# Patient Record
Sex: FEMALE | Race: BLACK OR AFRICAN AMERICAN | Employment: STUDENT | URBAN - METROPOLITAN AREA
[De-identification: names, ages, dates, MRNs, and addresses within clinical notes are randomized per-mention and may not be internally consistent; named-entity substitution may affect disease eponyms.]

---

## 2017-01-06 ENCOUNTER — OFFICE VISIT (OUTPATIENT)
Dept: OBGYN CLINIC | Age: 22
End: 2017-01-06

## 2017-01-06 VITALS
WEIGHT: 173 LBS | HEIGHT: 64 IN | SYSTOLIC BLOOD PRESSURE: 105 MMHG | RESPIRATION RATE: 18 BRPM | BODY MASS INDEX: 29.53 KG/M2 | DIASTOLIC BLOOD PRESSURE: 70 MMHG

## 2017-01-06 DIAGNOSIS — Z32.01 POSITIVE PREGNANCY TEST: ICD-10-CM

## 2017-01-06 DIAGNOSIS — N92.6 MISSED MENSES: ICD-10-CM

## 2017-01-06 DIAGNOSIS — Z34.81 ENCOUNTER FOR SUPERVISION OF OTHER NORMAL PREGNANCY IN FIRST TRIMESTER: Primary | ICD-10-CM

## 2017-01-06 LAB
ANTIBODY SCREEN, EXTERNAL: NEGATIVE
CHLAMYDIA, EXTERNAL: NEGATIVE
HBSAG, EXTERNAL: NEGATIVE
HCT, EXTERNAL: 34.6
HGB, EXTERNAL: 10.8
HIV, EXTERNAL: NEGATIVE
N. GONORRHEA, EXTERNAL: NEGATIVE
PLATELET CNT,   EXTERNAL: 335
RUBELLA, EXTERNAL: 4.2
T. PALLIDUM, EXTERNAL: NEGATIVE
TYPE, ABO & RH, EXTERNAL: NORMAL

## 2017-01-06 NOTE — PROGRESS NOTES
Current pregnancy history:    Rocío Barrett is a ,  24 y.o. female [address] No LMP recorded. .  She presents for the evaluation of amenorrhea and a positive pregnancy test.    LMP history:  The date of her LMP is  is certain. Her last menstrual period was normal and lasted for 4 to 5 days. A urine pregnancy test was positive 3 weeks ago. She was not on the pill at conception. Based on her LMP, her EDC is 17 and her EGA is 7 weeks, 2 days. Her menstrual cycles are regular and occur approximately every 35 days and range from 3 to 5 days. The last menses lasted the usual number of days. Ultrasound data:  She had an  ultrasound done by the physician today which revealed a viable bedoya pregnancy with a gestational age of 7 weeks and 3 days giving an [address] of 17. Pregnancy symptoms:    Since her LMP she has experienced  urinary frequency, breast tenderness, and nausea. She has not been vomiting over the last few weeks. Associated signs and symptoms which she denies: dysuria, discharge, vaginal bleeding. She states she has gained weight:  Approximately 5 pounds over the last few weeks. Relevant past pregnancy history:   She has the following pregnancy history: Her first pregnancy was uncomplicated. She has no history of  delivery. Relevant past medical history:(relevant to this pregnancy): noncontributory. Pap/Occupational history:  Last pap smear: last year Results: Normal      Her occupation is: . Lives in [place]    Substance history: negative for alcohol, tobacco and street drugs. Positive for nothing. Exposure history: There is/are no indoor cat/s in the home. The patient was instructed to not change the cat litter. She admits close contact with children on a regular basis. She has had chicken pox or the vaccine in the past.   Patient denies issues with domestic violence.      Genetic Screening/Teratology Counseling: (Includes patient, baby's father, or anyone in either family with:)  3.  Patient's age >/= 28 [facility]?-- no  .   2. [facility] ([facility] [place] [address], or  background): MCV<80?--no.     3.  Neural tube defect (meningomyelocele, spina bifida, anencephaly)?--no.   4.  Congenital heart defect?--no.  5.  Down syndrome?--no.   6.  Kaleb-Sachs ([employer], [place])?--no.   7.  Canavan's Disease?--no.   8.  Familial Dysautonomia?--no.   9.  Sickle cell disease or trait ()? --no   The patient has not been tested for sickle trait  10. Hemophilia or other blood disorders?--no. 11.  Muscular dystrophy?--no. 12.  Cystic fibrosis?--no. 13.  Abeba's Chorea?--no. 14.  Mental retardation/autism (if yes was person tested for Fragile X)?--no. 15.  Other inherited genetic or chromosomal disorder?--no. 12.  Maternal metabolic disorder (DM, PKU, etc)?--no. 17.  Patient or FOB with a child with a birth defect not listed above?--no.  17a. Patient or FOB with a birth defect themselves?--no. 18.  Recurrent pregnancy loss, or stillbirth?--no. 19.  Any medications since LMP other than prenatal vitamins (include vitamins, supplements, OTC meds, drugs, alcohol)?--no. 20.  Any other genetic/environmental exposure to discuss?--no. Infection History:  1. Lives with someone with TB or TB exposed?--no.   2.  Patient or partner has history of genital herpes?--no.  3.  Rash or viral illness since LMP?--no.    4.  History of STD (GC, CT, HPV, syphilis, HIV)? --no   5. Other: OTHER? Past Medical History   Diagnosis Date    Asthma      Past Surgical History   Procedure Laterality Date    Hx dilation and curettage  08/10/2016     Missed AB     Social History     Occupational History    Not on file.      Social History Main Topics    Smoking status: Never Smoker    Smokeless tobacco: Never Used    Alcohol use No    Drug use: No    Sexual activity: Yes     Partners: Male     Birth control/ protection: None     Family History   Problem Relation Age of Onset    Hypertension Brother     Stroke Paternal Grandmother     Diabetes Maternal Grandmother     No Known Problems Mother     No Known Problems Father      OB History    Para Term  AB SAB TAB Ectopic Multiple Living   3 1 1 0 2 2 0 0 0 1      # Outcome Date GA Lbr Mirza/2nd Weight Sex Delivery Anes PTL Lv   3 Term 07/13/15 40w2d   M Vag-Spont  N Y   2 SAB            1 SAB                 No Known Allergies  Prior to Admission medications    Medication Sig Start Date End Date Taking? Authorizing Provider   PRENATAL VIT W-CA,FE,FA,<1 MG, (PRENATAL VITAMIN PO) Take  by mouth.    Yes Historical Provider        Review of Systems: History obtained from the patient  Constitutional: negative for weight loss, fever, night sweats  HEENT: negative for hearing loss, earache, congestion, snoring, sore throat  CV: negative for chest pain, palpitations, edema  Resp: negative for cough, shortness of breath, wheezing  Breast: negative for breast lumps, nipple discharge, galactorrhea  GI: negative for change in bowel habits, abdominal pain, black or bloody stools  : negative for frequency, dysuria, hematuria, vaginal discharge  MSK: negative for back pain, joint pain, muscle pain  Skin: negative for itching, rash, hives  Neuro: negative for dizziness, headache, confusion, weakness  Psych: negative for anxiety, depression, change in mood  Heme/lymph: negative for bleeding, bruising, pallor    Objective:  Visit Vitals    /70 (BP 1 Location: Right arm, BP Patient Position: Sitting)    Resp 18    Ht 5' 4\" (1.626 m)    Wt 173 lb (78.5 kg)    BMI 29.7 kg/m2       Physical Exam:     Constitutional  · Appearance: well-nourished, well developed, alert, in no acute distress    HENT  · Head  · Face: appears normal  · Eyes: appear normal  · Ears: normal  · Mouth: normal  · Lips: no lesions    Neck  · Inspection/Palpation: normal appearance, no masses or tenderness  · Lymph Nodes: no lymphadenopathy present  · Thyroid: gland size normal, nontender, no nodules or masses present on palpation    Chest  · Respiratory Effort: breathing unlabored  · Auscultation: normal breath sounds    Cardiovascular  · Heart:  · Auscultation: regular rate and rhythm without murmur    Breasts  · Inspection of Breasts: breasts symmetrical, no skin changes, no discharge present, nipple appearance normal, no skin retraction present  · Palpation of Breasts and Axillae: no masses present on palpation, no breast tenderness  · Axillary Lymph Nodes: no lymphadenopathy present    Gastrointestinal  · Abdominal Examination: abdomen non-tender to palpation, normal bowel sounds, no masses present  · Liver and spleen: no hepatomegaly present, spleen not palpable  · Hernias: no hernias identified    Genitourinary  · External Genitalia: normal appearance for age, no discharge present, no tenderness present, no inflammatory lesions present, no masses present, no atrophy present  · Vagina: normal vaginal vault without central or paravaginal defects, no discharge present, no inflammatory lesions present, no masses present  · Bladder: non-tender to palpation  · Urethra: appears normal  · Cervix: normal   · Uterus: enlarged, normal shape, soft  · Adnexa: no adnexal tenderness present, no adnexal masses present  · Perineum: perineum within normal limits, no evidence of trauma, no rashes or skin lesions present  · Anus: anus within normal limits, no hemorrhoids present  · Inguinal Lymph Nodes: no lymphadenopathy present    Skin  · General Inspection: no rash, no lesions identified    Neurologic/Psychiatric  · Mental Status:  · Orientation: grossly oriented to person, place and time  · Mood and Affect: mood normal, affect appropriate    Assessment:   Intrauterine pregnancy with the following problems identified: dates off.     Plan:     Offered CF testing, CVS, Nuchal Translucency, MSAFP, amnio, and discussed NIPT  Course of pregnancy discussed including visit schedule, routine U/S, glucola testing, etc.  Avoid alcoholic beverages and illicit/recreational drugs use  Take prenatal vitamins or folic acid daily. Hospital and practice style discussed with coverage system. Discussed nutrition, toxoplasmosis precautions, sexual activity, exercise, need for influenza vaccine, environmental and work hazards, travel advice, screen for domestic violence, need for seat belts. Discussed seafood, unpasteurized dairy products, deli meat, artificial sweeteners, and caffeine. Information on prenatal classes/breastfeeding given. Information on circumcision given  Patient encouraged not to smoke. Discussed current prescription drug use. Given medication list.  Discussed the use of over the counter medications and chemicals. Route of delivery discussed, including risks, benefits, and alternatives of  versus repeat LTCS. Pt understands risk of hemorrhage during pregnancy and post delivery and would accept blood products if necessary in life-threatening emergencies    Handouts given to pt. Transvaginal First Trimester Ultrasound Procedure    Felix Britt is a ,  24 y.o. female [address] No LMP recorded. This makes her currently 7 weeks and 2 days of gestation by dates. She is here today for early pregnancy ultrasound for pregnancy dating. Ultrasound results:   A bedoya intrauterine pregnancy with visible cardiac activity is seen. The yolk sac is visible and measures approximately 0.46 cm in diameter.   The crown rump length of the fetus is measurable at 0.59 cm, consistent with 6 weeks and 3 days  Subchorionic hemorrhage was not seen  Right Ovary - NORMAL   Left Ovary - NORMAL   Comment:

## 2017-01-09 LAB — BACTERIA UR CULT: NORMAL

## 2017-01-10 LAB
ABO GROUP BLD: NORMAL
BLD GP AB SCN SERPL QL: NEGATIVE
C TRACH RRNA SPEC QL NAA+PROBE: NEGATIVE
ERYTHROCYTE [DISTWIDTH] IN BLOOD BY AUTOMATED COUNT: 16.3 % (ref 12.3–15.4)
HBV SURFACE AG SERPL QL IA: NEGATIVE
HCT VFR BLD AUTO: 34.6 % (ref 34–46.6)
HGB A MFR BLD: 98 % (ref 94–98)
HGB A2 MFR BLD COLUMN CHROM: 2 % (ref 0.7–3.1)
HGB BLD-MCNC: 10.8 G/DL (ref 11.1–15.9)
HGB C MFR BLD: 0 %
HGB F MFR BLD: 0 % (ref 0–2)
HGB FRACT BLD-IMP: NORMAL
HGB S BLD QL SOLY: NEGATIVE
HGB S MFR BLD: 0 %
HIV 1+2 AB+HIV1 P24 AG SERPL QL IA: NON REACTIVE
MCH RBC QN AUTO: 23.2 PG (ref 26.6–33)
MCHC RBC AUTO-ENTMCNC: 31.2 G/DL (ref 31.5–35.7)
MCV RBC AUTO: 74 FL (ref 79–97)
N GONORRHOEA RRNA SPEC QL NAA+PROBE: NEGATIVE
PLATELET # BLD AUTO: 335 X10E3/UL (ref 150–379)
RBC # BLD AUTO: 4.65 X10E6/UL (ref 3.77–5.28)
RH BLD: POSITIVE
RUBV IGG SERPL IA-ACNC: 4.2 INDEX
T PALLIDUM AB SER QL IA: NEGATIVE
WBC # BLD AUTO: 8.8 X10E3/UL (ref 3.4–10.8)

## 2017-01-27 ENCOUNTER — ROUTINE PRENATAL (OUTPATIENT)
Dept: OBGYN CLINIC | Age: 22
End: 2017-01-27

## 2017-01-27 VITALS
DIASTOLIC BLOOD PRESSURE: 58 MMHG | HEIGHT: 64 IN | SYSTOLIC BLOOD PRESSURE: 110 MMHG | WEIGHT: 171.2 LBS | BODY MASS INDEX: 29.23 KG/M2

## 2017-01-27 DIAGNOSIS — Z34.81 OTHER NORMAL PREGNANCY, NOT FIRST, FIRST TRIMESTER: Primary | ICD-10-CM

## 2017-01-27 NOTE — PROGRESS NOTES
TA ULTRASOUND PERFORMED. A SINGLE VIABLE 9W1D IUP IS SEEN WITH NORMAL CARDIAC RHYTHM. GESTATIONAL AGE BASED ON TODAYS US.  A NORMAL APPEARING YOLK [address] IS SEEN. RIGHT & LEFT ADNEXA APPEAR WITHIN NORMAL LIMITS.

## 2017-01-27 NOTE — PATIENT INSTRUCTIONS

## 2017-02-24 ENCOUNTER — ROUTINE PRENATAL (OUTPATIENT)
Dept: OBGYN CLINIC | Age: 22
End: 2017-02-24

## 2017-02-24 VITALS
HEIGHT: 64 IN | DIASTOLIC BLOOD PRESSURE: 68 MMHG | WEIGHT: 168 LBS | BODY MASS INDEX: 28.68 KG/M2 | RESPIRATION RATE: 18 BRPM | SYSTOLIC BLOOD PRESSURE: 108 MMHG

## 2017-02-24 DIAGNOSIS — Z34.82 OTHER NORMAL PREGNANCY, NOT FIRST, SECOND TRIMESTER: Primary | ICD-10-CM

## 2017-02-24 NOTE — PATIENT INSTRUCTIONS

## 2017-03-24 ENCOUNTER — ROUTINE PRENATAL (OUTPATIENT)
Dept: OBGYN CLINIC | Age: 22
End: 2017-03-24

## 2017-03-24 VITALS
HEIGHT: 64 IN | BODY MASS INDEX: 29.88 KG/M2 | DIASTOLIC BLOOD PRESSURE: 60 MMHG | SYSTOLIC BLOOD PRESSURE: 106 MMHG | WEIGHT: 175 LBS

## 2017-03-24 DIAGNOSIS — Z34.82 OTHER NORMAL PREGNANCY, NOT FIRST, SECOND TRIMESTER: Primary | ICD-10-CM

## 2017-03-24 LAB — AFP, MATERNAL, EXTERNAL: NEGATIVE

## 2017-03-24 NOTE — PATIENT INSTRUCTIONS

## 2017-03-26 LAB
AFP ADJ MOM SERPL: 0.62
AFP INTERP SERPL-IMP: NORMAL
AFP INTERP SERPL-IMP: NORMAL
AFP SERPL-MCNC: 23.1 NG/ML
AGE AT DELIVERY: 23.1 YEARS
COMMENT, 018013: NORMAL
GA METHOD: NORMAL
GA: 17 WEEKS
IDDM PATIENT QL: NO
Lab: NORMAL
MULTIPLE PREGNANCY: NO
NEURAL TUBE DEFECT RISK FETUS: NORMAL %
RESULTS, 017004: NORMAL

## 2017-04-21 ENCOUNTER — ROUTINE PRENATAL (OUTPATIENT)
Dept: OBGYN CLINIC | Age: 22
End: 2017-04-21

## 2017-04-21 VITALS
SYSTOLIC BLOOD PRESSURE: 110 MMHG | WEIGHT: 176 LBS | HEIGHT: 64 IN | BODY MASS INDEX: 30.05 KG/M2 | DIASTOLIC BLOOD PRESSURE: 64 MMHG

## 2017-04-21 DIAGNOSIS — Z34.82 OTHER NORMAL PREGNANCY, NOT FIRST, SECOND TRIMESTER: Primary | ICD-10-CM

## 2017-04-21 NOTE — PROGRESS NOTES
SINGLE VIABLE IUP AT 21W1D GA BY LMP. FETAL CARDIAC MOTION OBSERVED. FETAL ANATOMY WELL VISUALIZED AND APPEARS WITHIN NORMAL LIMITS. NO ABNORMALITIES IDENTIFIED ON TODAYS EXAM.  APPROPRIATE GROWTH MEASURED; SIZE = DATES. AMADOU, CERVIX AND PLACENTA APPEAR WITHIN NORMAL LIMITS.   GENDER: XY

## 2017-05-12 ENCOUNTER — ROUTINE PRENATAL (OUTPATIENT)
Dept: OBGYN CLINIC | Age: 22
End: 2017-05-12

## 2017-05-12 VITALS
SYSTOLIC BLOOD PRESSURE: 118 MMHG | DIASTOLIC BLOOD PRESSURE: 62 MMHG | WEIGHT: 179 LBS | BODY MASS INDEX: 30.56 KG/M2 | HEIGHT: 64 IN

## 2017-05-12 DIAGNOSIS — Z34.82 OTHER NORMAL PREGNANCY, NOT FIRST, SECOND TRIMESTER: Primary | ICD-10-CM

## 2017-05-12 NOTE — PATIENT INSTRUCTIONS
Weeks 22 to 26 of Your Pregnancy: Care Instructions  Your Care Instructions    As you enter your 7th month of pregnancy at week 26, your baby's lungs are growing stronger and getting ready to breathe. You may notice that your baby responds to the sound of your or your partner's voice. You may also notice that your baby does less turning and twisting and more squirming or jerking. Jerking often means that your baby has the hiccups. Hiccups are perfectly normal and are only temporary. You may want to think about attending a childbirth preparation class. This is also a good time to start thinking about whether you want to have pain medicine during labor. Most pregnant women are tested for gestational diabetes between weeks 25 and 28. Gestational diabetes occurs when your blood sugar level gets too high when you're pregnant. The test is important, because you can have gestational diabetes and not know it. But the condition can cause problems for your baby. Follow-up care is a key part of your treatment and safety. Be sure to make and go to all appointments, and call your doctor if you are having problems. It's also a good idea to know your test results and keep a list of the medicines you take. How can you care for yourself at home? Ease discomfort from your baby's kicking  · Change your position. Sometimes this will cause your baby to change position too. · Take a deep breath while you raise your arm over your head. Then breathe out while you drop your arm. Do Kegel exercises to prevent urine from leaking  · You can do Kegel exercises while you stand or sit. ¨ Squeeze the same muscles you would use to stop your urine. Your belly and thighs should not move. ¨ Hold the squeeze for 3 seconds, and then relax for 3 seconds. ¨ Start with 3 seconds. Then add 1 second each week until you are able to squeeze for 10 seconds. ¨ Repeat the exercise 10 to 15 times for each session.  Do three or more sessions each day.  Ease or reduce swelling in your feet, ankles, hands, and fingers  · If your fingers are puffy, take off your rings. · Do not eat high-salt foods, such as potato chips. · Prop up your feet on a stool or couch as much as possible. Sleep with pillows under your feet. · Do not stand for long periods of time or wear tight shoes. · Wear support stockings. Where can you learn more? Go to http://willian-gina.info/. Enter G264 in the search box to learn more about \"Weeks 22 to 26 of Your Pregnancy: Care Instructions. \"  Current as of: May 30, 2016  Content Version: 11.2  © 0927-1434 [employer], [employer]. Care instructions adapted under license by [employer] (which disclaims liability or warranty for this information). If you have questions about a medical condition or this instruction, always ask your healthcare professional. [place] [address] any warranty or liability for your use of this information.

## 2017-06-16 ENCOUNTER — ROUTINE PRENATAL (OUTPATIENT)
Dept: OBGYN CLINIC | Age: 22
End: 2017-06-16

## 2017-06-16 VITALS
WEIGHT: 188 LBS | BODY MASS INDEX: 32.1 KG/M2 | HEIGHT: 64 IN | DIASTOLIC BLOOD PRESSURE: 68 MMHG | SYSTOLIC BLOOD PRESSURE: 114 MMHG

## 2017-06-16 DIAGNOSIS — Z34.83 OTHER NORMAL PREGNANCY, NOT FIRST, THIRD TRIMESTER: Primary | ICD-10-CM

## 2017-06-16 LAB
GTT, 1 HR, GLUCOLA, EXTERNAL: 108
HCT, EXTERNAL: 29.9
HGB, EXTERNAL: 9.6
PLATELET CNT,   EXTERNAL: 274

## 2017-06-16 NOTE — PATIENT INSTRUCTIONS
Weeks 26 to 30 of Your Pregnancy: Care Instructions  Your Care Instructions    You are now in your last trimester of pregnancy. Your baby is growing rapidly. And you'll probably feel your baby moving around more often. Your doctor may ask you to count your baby's kicks. Your back may ache as your body gets used to your baby's size and length. If you haven't already had the Tdap shot during this pregnancy, talk to your doctor about getting it. It will help protect your  against pertussis infection. During this time, it's important to take care of yourself and pay attention to what your body needs. If you feel sexual, explore ways to be close with your partner that match your comfort and desire. Use the tips provided in this care sheet to find ways to be sexual in your own way. Follow-up care is a key part of your treatment and safety. Be sure to make and go to all appointments, and call your doctor if you are having problems. It's also a good idea to know your test results and keep a list of the medicines you take. How can you care for yourself at home? Take it easy at work  · Take frequent breaks. If possible, stop working when you are tired, and rest during your lunch hour. · Take bathroom breaks every 2 hours. · Change positions often. If you sit for long periods, stand up and walk around. · When you stand for a long time, keep one foot on a low stool with your knee bent. After standing a lot, sit with your feet up. · Avoid fumes, chemicals, and tobacco smoke. Be sexual in your own way  · Having sex during pregnancy is okay, unless your doctor tells you not to. · You may be very interested in sex, or you may have no interest at all. · Your growing belly can make it hard to find a good position during intercourse. [place] and explore. · You may get cramps in your uterus when your partner touches your breasts.   · A back rub may relieve the backache or cramps that sometimes follow orgasm. Learn about  labor  · Watch for signs of  labor. You may be going into labor if:  ¨ You have menstrual-like cramps, with or without nausea. ¨ You have about 4 or more contractions in 20 minutes, or about 8 or more within 1 hour, even after you have had a glass of water and are resting. ¨ You have a low, dull backache that does not go away when you change your position. ¨ You have pain or pressure in your pelvis that comes and goes in a pattern. ¨ You have intestinal cramping or flu-like symptoms, with or without diarrhea. ¨ You notice an increase or change in your vaginal discharge. Discharge may be heavy, mucus-like, watery, or streaked with blood. ¨ Your water breaks. · If you think you have  labor:  ¨ Drink 2 or 3 glasses of water or juice. Not drinking enough fluids can cause contractions. ¨ Stop what you are doing, and empty your bladder. Then lie down on your left side for at least 1 hour. ¨ While lying on your side, find your breast bone. Put your fingers in the soft spot just below it. Move your fingers down toward your belly button to find the top of your uterus. Check to see if it is tight. ¨ Contractions can be weak or strong. Record your contractions for an hour. Time a contraction from the start of one contraction to the start of the next one. ¨ Single or several strong contractions without a pattern are called Jeremi-Newman contractions. They are practice contractions but not the start of labor. They often stop if you change what you are doing. ¨ Call your doctor if you have regular contractions. Where can you learn more? Go to http://willian-gina.info/. Enter Q575 in the search box to learn more about \"Weeks 26 to 30 of Your Pregnancy: Care Instructions. \"  Current as of: May 30, 2016  Content Version: 11.2  © 8832-5856 [employer].  Care instructions adapted under license by [employer] (which disclaims liability or warranty for this information). If you have questions about a medical condition or this instruction, always ask your healthcare professional. [place] [address] any warranty or liability for your use of this information.

## 2017-06-16 NOTE — PROGRESS NOTES
Patient in for routine OB visit. Per MD recommendations, patient to receive TDAP vaccine this visit. Informed consent signed by patient. This writer administered medication via injection in right deltoid. Informed patient of possible site soreness and/or redness. Expresses understanding at this time. Patient tolerated injection well without difficulty.

## 2017-06-17 LAB
ERYTHROCYTE [DISTWIDTH] IN BLOOD BY AUTOMATED COUNT: 16 % (ref 12.3–15.4)
GLUCOSE 1H P 50 G GLC PO SERPL-MCNC: 108 MG/DL (ref 65–129)
HCT VFR BLD AUTO: 29.9 % (ref 34–46.6)
HGB BLD-MCNC: 9.6 G/DL (ref 11.1–15.9)
MCH RBC QN AUTO: 23.2 PG (ref 26.6–33)
MCHC RBC AUTO-ENTMCNC: 32.1 G/DL (ref 31.5–35.7)
MCV RBC AUTO: 72 FL (ref 79–97)
PLATELET # BLD AUTO: 274 X10E3/UL (ref 150–379)
RBC # BLD AUTO: 4.14 X10E6/UL (ref 3.77–5.28)
WBC # BLD AUTO: 8.5 X10E3/UL (ref 3.4–10.8)

## 2017-06-19 RX ORDER — LANOLIN ALCOHOL/MO/W.PET/CERES
325 CREAM (GRAM) TOPICAL 2 TIMES DAILY
Qty: 60 TAB | Refills: 3 | Status: SHIPPED | OUTPATIENT
Start: 2017-06-19 | End: 2017-08-27

## 2017-06-19 NOTE — PROGRESS NOTES
Patient called at 9:490am. Patient advised of MD reviewed labs and recommendations. Patient advised to keep up fluids and she may want to take a stool softener to help with any possible constipation. Patient advised that GBS will be checked at around 36 weeks. Patient verbalized understanding.

## 2017-06-19 NOTE — PROGRESS NOTES
Called patients mobile phone on file, was patients significant other. Asked him to advise her to call us back at her convenience.

## 2017-06-29 ENCOUNTER — ROUTINE PRENATAL (OUTPATIENT)
Dept: OBGYN CLINIC | Age: 22
End: 2017-06-29

## 2017-06-29 VITALS
SYSTOLIC BLOOD PRESSURE: 128 MMHG | HEIGHT: 64 IN | DIASTOLIC BLOOD PRESSURE: 68 MMHG | WEIGHT: 191 LBS | BODY MASS INDEX: 32.61 KG/M2

## 2017-06-29 DIAGNOSIS — Z34.83 OTHER NORMAL PREGNANCY, NOT FIRST, THIRD TRIMESTER: Primary | ICD-10-CM

## 2017-06-29 NOTE — PATIENT INSTRUCTIONS
Weeks 30 to 32 of Your Pregnancy: Care Instructions  Your Care Instructions    You have made it to the final months of your pregnancy. By now, your baby is really starting to look like a baby, with hair and plump skin. As you enter the final weeks of pregnancy, the reality of having a baby may start to set in. This is the time to settle on a name, get your household in order, set up a safe nursery, and find quality  if needed. Doing these things in advance will allow you to focus on caring for and enjoying your new baby. You may also want to have a tour of your hospital's labor and delivery unit to get a better idea of what to expect while you are in the hospital.  During these last months, it is very important to take good care of yourself and pay attention to what your body needs. If your doctor says it is okay for you to work, don't push yourself too hard. Use the tips provided in this care sheet to ease heartburn and care for varicose veins. If you haven't already had the Tdap shot during this pregnancy, talk to your doctor about getting it. It will help protect your  against pertussis infection. Follow-up care is a key part of your treatment and safety. Be sure to make and go to all appointments, and call your doctor if you are having problems. It's also a good idea to know your test results and keep a list of the medicines you take. How can you care for yourself at home? Pay attention to your baby's movements  · You should feel your baby move several times every day. · Your baby now turns less, and kicks and jabs more. · Your baby sleeps 20 to 45 minutes at a time and is more active at certain times of day. · If your doctor wants you to count your baby's kicks:  ¨ Empty your bladder, and lie on your side or relax in a comfortable chair. ¨ Write down your start time. ¨ Pay attention only to your baby's movements. Count any movement except hiccups.   ¨ After you have counted 10 movements, write down your stop time. ¨ Write down how many minutes it took for your baby to move 10 times. ¨ If an hour goes by and you have not recorded 10 movements, have something to eat or drink and then count for another hour. If you do not record 10 movements in either hour, call your doctor. Ease heartburn  · Eat small, frequent meals. · Do not eat chocolate, peppermint, or very spicy foods. Avoid drinks with caffeine, such as coffee, tea, and sodas. · Avoid bending over or lying down after meals. · Talk a short walk after you eat. · If heartburn is a problem at night, do not eat for 2 hours before bedtime. · Take antacids like Mylanta, Maalox, Rolaids, or Tums. Do not take antacids that have sodium bicarbonate. Care for varicose veins  · Varicose veins are blood vessels that stretch out with the extra blood during pregnancy. Your legs may ache or throb. Most varicose veins will go away after the birth. · Avoid standing for long periods of time. Sit with your legs crossed at the ankles, not the knees. · Sit with your feet propped up. · Avoid tight clothing or stockings. Wear support hose. · Exercise regularly. Try walking for at least 30 minutes a day. Where can you learn more? Go to http://willian-gina.info/. Enter Y474 in the search box to learn more about \"Weeks 30 to 32 of Your Pregnancy: Care Instructions. \"  Current as of: March 16, 2017  Content Version: 11.3  © 6582-5599 [employer]. Care instructions adapted under license by [employer] (which disclaims liability or warranty for this information). If you have questions about a medical condition or this instruction, always ask your healthcare professional. [place] [address] any warranty or liability for your use of this information.

## 2017-07-12 ENCOUNTER — ROUTINE PRENATAL (OUTPATIENT)
Dept: OBGYN CLINIC | Age: 22
End: 2017-07-12

## 2017-07-12 VITALS
HEIGHT: 64 IN | WEIGHT: 194 LBS | DIASTOLIC BLOOD PRESSURE: 62 MMHG | SYSTOLIC BLOOD PRESSURE: 100 MMHG | BODY MASS INDEX: 33.12 KG/M2

## 2017-07-12 DIAGNOSIS — Z34.83 OTHER NORMAL PREGNANCY, NOT FIRST, THIRD TRIMESTER: Primary | ICD-10-CM

## 2017-07-12 NOTE — PATIENT INSTRUCTIONS

## 2017-07-26 ENCOUNTER — ROUTINE PRENATAL (OUTPATIENT)
Dept: OBGYN CLINIC | Age: 22
End: 2017-07-26

## 2017-07-26 VITALS
BODY MASS INDEX: 33.46 KG/M2 | DIASTOLIC BLOOD PRESSURE: 66 MMHG | HEIGHT: 64 IN | WEIGHT: 196 LBS | SYSTOLIC BLOOD PRESSURE: 104 MMHG

## 2017-07-26 DIAGNOSIS — Z34.83 OTHER NORMAL PREGNANCY, NOT FIRST, THIRD TRIMESTER: Primary | ICD-10-CM

## 2017-07-26 LAB — GRBS, EXTERNAL: NEGATIVE

## 2017-07-26 NOTE — PATIENT INSTRUCTIONS

## 2017-07-28 LAB — GP B STREP DNA SPEC QL NAA+PROBE: NEGATIVE

## 2017-08-04 ENCOUNTER — ROUTINE PRENATAL (OUTPATIENT)
Dept: OBGYN CLINIC | Age: 22
End: 2017-08-04

## 2017-08-04 VITALS
BODY MASS INDEX: 33.46 KG/M2 | HEIGHT: 64 IN | SYSTOLIC BLOOD PRESSURE: 110 MMHG | DIASTOLIC BLOOD PRESSURE: 60 MMHG | WEIGHT: 196 LBS

## 2017-08-04 DIAGNOSIS — Z34.83 OTHER NORMAL PREGNANCY, NOT FIRST, THIRD TRIMESTER: Primary | ICD-10-CM

## 2017-08-11 ENCOUNTER — ROUTINE PRENATAL (OUTPATIENT)
Dept: OBGYN CLINIC | Age: 22
End: 2017-08-11

## 2017-08-11 ENCOUNTER — OFFICE VISIT (OUTPATIENT)
Dept: OBGYN CLINIC | Age: 22
End: 2017-08-11

## 2017-08-11 VITALS
BODY MASS INDEX: 34.31 KG/M2 | HEIGHT: 64 IN | WEIGHT: 201 LBS | DIASTOLIC BLOOD PRESSURE: 60 MMHG | SYSTOLIC BLOOD PRESSURE: 114 MMHG

## 2017-08-11 VITALS
WEIGHT: 201 LBS | HEIGHT: 64 IN | DIASTOLIC BLOOD PRESSURE: 60 MMHG | BODY MASS INDEX: 34.31 KG/M2 | SYSTOLIC BLOOD PRESSURE: 114 MMHG

## 2017-08-11 DIAGNOSIS — Z34.83 OTHER NORMAL PREGNANCY, NOT FIRST, THIRD TRIMESTER: Primary | ICD-10-CM

## 2017-08-11 NOTE — LETTER
8/11/2017 3:18 PM 
 
Ms. Rocío Weaver 66 [phone] To Whom It May Concern: The patient is currently under our care. As of Monday August 14, 2017 she will no longer be able to work due to pregnancy complications. She will return 6 weeks after the baby is delivered. If you have any questions please do not hesitate to contact us. Sincerely, Blu Bolton MD

## 2017-08-18 ENCOUNTER — ROUTINE PRENATAL (OUTPATIENT)
Dept: OBGYN CLINIC | Age: 22
End: 2017-08-18

## 2017-08-18 VITALS — BODY MASS INDEX: 34.84 KG/M2 | SYSTOLIC BLOOD PRESSURE: 110 MMHG | DIASTOLIC BLOOD PRESSURE: 60 MMHG | WEIGHT: 203 LBS

## 2017-08-18 DIAGNOSIS — Z34.83 OTHER NORMAL PREGNANCY, NOT FIRST, THIRD TRIMESTER: Primary | ICD-10-CM

## 2017-08-18 NOTE — PATIENT INSTRUCTIONS
Week 38 of Your Pregnancy: Care Instructions  Your Care Instructions    Believe it or not, your baby is almost here. You may have ideas about your baby's personality because of how much he or she moves. Or you may have noticed how he or she responds to sounds, warmth, cold, and light. You may even know what kind of music your baby likes. By now, you have a better idea of what to expect during delivery. You may have talked about your birth preferences with your doctor. But even if you want a vaginal birth, it is a good idea to learn about  births.  birth means that your baby is born through a cut (incision) in your lower belly. It is sometimes the best choice for the health of the baby and the mother. This care sheet can help you understand  births. It also gives you information about what to expect after your baby is born. And it helps you understand more about postpartum depression. Follow-up care is a key part of your treatment and safety. Be sure to make and go to all appointments, and call your doctor if you are having problems. It's also a good idea to know your test results and keep a list of the medicines you take. How can you care for yourself at home? Learn about  birth  · Most C-sections are unplanned. They are done because of problems that occur during labor. These problems might include:  ¨ Labor that slows or stops. ¨ High blood pressure or other problems for the mother. ¨ Signs of distress in the baby. These signs may include a very fast or slow heart rate. · Although most mothers and babies do well after , it is major surgery. It has more risks than a vaginal delivery. · In some cases, a planned  may be safer than a vaginal delivery. This may be the case if:  ¨ The mother has a health problem, such as a heart condition. ¨ The baby isn't in a head-down position for delivery. This is called a breech position.   ¨ The uterus has scars from past surgeries. This could increase the chance of a tear in the uterus. ¨ There is a problem with the placenta. ¨ The mother has an infection, such as genital herpes, that could be spread to the baby. ¨ The mother is having twins or more. ¨ The baby weighs 9 to 10 pounds or more. · Because of the risks of , planned C-sections generally should be done only for medical reasons. And a planned  should be done at 39 weeks or later unless there is a medical reason to do it sooner. Know what to expect after delivery, and plan for the first few weeks at home  · You, your baby, and your partner or  will get identification bands. Only people with matching bands can  the baby from the nursery. · You will learn how to feed, diaper, and bathe your baby. And you will learn how to care for the umbilical cord stump. If your baby will be circumcised, you will also learn how to care for that. · Ask people to wait to visit you until you are at home. And ask them to wash their hands before they touch your baby. · Make sure you have another adult in your home for at least 2 or 3 days after the birth. · During the first 2 weeks, limit when friends and family can visit. · Do not allow visitors who have colds or infections. Make sure all visitors are up to date with their vaccinations. Never let anyone smoke around your baby. · Try to nap when the baby naps. Be aware of postpartum depression  · \"Baby blues\" are common for the first 1 to 2 weeks after birth. You may cry or feel sad or irritable for no reason. · For some women, these feelings last longer and are more intense. This is called postpartum depression. · If your symptoms last for more than a few weeks or you feel very depressed, ask your doctor for help. · Postpartum depression can be treated. Support groups and counseling can help. Sometimes medicine can also help. Where can you learn more?   Go to http://willian-gina.info/. Enter B044 in the search box to learn more about \"Week 38 of Your Pregnancy: Care Instructions. \"  Current as of: March 16, 2017  Content Version: 11.3  © 5773-2882 [employer]. Care instructions adapted under license by [employer] (which disclaims liability or warranty for this information). If you have questions about a medical condition or this instruction, always ask your healthcare professional. [address] any warranty or liability for your use of this information.

## 2017-08-25 ENCOUNTER — ANESTHESIA EVENT (OUTPATIENT)
Dept: LABOR AND DELIVERY | Age: 22
End: 2017-08-25
Payer: OTHER GOVERNMENT

## 2017-08-25 ENCOUNTER — ANESTHESIA (OUTPATIENT)
Dept: LABOR AND DELIVERY | Age: 22
End: 2017-08-25
Payer: OTHER GOVERNMENT

## 2017-08-25 ENCOUNTER — HOSPITAL ENCOUNTER (INPATIENT)
Age: 22
LOS: 2 days | Discharge: HOME | End: 2017-08-27
Attending: OBSTETRICS & GYNECOLOGY | Admitting: OBSTETRICS & GYNECOLOGY
Payer: OTHER GOVERNMENT

## 2017-08-25 PROBLEM — Z34.90 PREGNANT: Status: ACTIVE | Noted: 2017-08-25

## 2017-08-25 PROBLEM — Z34.90 PREGNANCY: Status: ACTIVE | Noted: 2017-08-25

## 2017-08-25 LAB
BASOPHILS # BLD: 0 K/UL (ref 0–0.1)
BASOPHILS NFR BLD: 0 % (ref 0–1)
EOSINOPHIL # BLD: 0.1 K/UL (ref 0–0.4)
EOSINOPHIL NFR BLD: 1 % (ref 0–7)
ERYTHROCYTE [DISTWIDTH] IN BLOOD BY AUTOMATED COUNT: 15.4 % (ref 11.5–14.5)
HCT VFR BLD AUTO: 31 % (ref 35–47)
HGB BLD-MCNC: 9.8 G/DL (ref 11.5–16)
LYMPHOCYTES # BLD: 1.4 K/UL (ref 0.8–3.5)
LYMPHOCYTES NFR BLD: 19 % (ref 12–49)
MCH RBC QN AUTO: 22.6 PG (ref 26–34)
MCHC RBC AUTO-ENTMCNC: 31.6 G/DL (ref 30–36.5)
MCV RBC AUTO: 71.4 FL (ref 80–99)
MONOCYTES # BLD: 0.7 K/UL (ref 0–1)
MONOCYTES NFR BLD: 9 % (ref 5–13)
NEUTS SEG # BLD: 5.4 K/UL (ref 1.8–8)
NEUTS SEG NFR BLD: 71 % (ref 32–75)
PLATELET # BLD AUTO: 267 K/UL (ref 150–400)
RBC # BLD AUTO: 4.34 M/UL (ref 3.8–5.2)
WBC # BLD AUTO: 7.6 K/UL (ref 3.6–11)

## 2017-08-25 PROCEDURE — 76060000078 HC EPIDURAL ANESTHESIA: Performed by: NURSE ANESTHETIST, CERTIFIED REGISTERED

## 2017-08-25 PROCEDURE — 74011250637 HC RX REV CODE- 250/637: Performed by: OBSTETRICS & GYNECOLOGY

## 2017-08-25 PROCEDURE — 65270000029 HC RM PRIVATE

## 2017-08-25 PROCEDURE — 99282 EMERGENCY DEPT VISIT SF MDM: CPT | Performed by: OBSTETRICS & GYNECOLOGY

## 2017-08-25 PROCEDURE — 77030014125 HC TY EPDRL BBMI -B: Performed by: ANESTHESIOLOGY

## 2017-08-25 PROCEDURE — 85025 COMPLETE CBC W/AUTO DIFF WBC: CPT | Performed by: OBSTETRICS & GYNECOLOGY

## 2017-08-25 PROCEDURE — 75410000003 HC RECOV DEL/VAG/CSECN EA 0.5 HR: Performed by: OBSTETRICS & GYNECOLOGY

## 2017-08-25 PROCEDURE — 36415 COLL VENOUS BLD VENIPUNCTURE: CPT | Performed by: OBSTETRICS & GYNECOLOGY

## 2017-08-25 PROCEDURE — 00HU33Z INSERTION OF INFUSION DEVICE INTO SPINAL CANAL, PERCUTANEOUS APPROACH: ICD-10-PCS | Performed by: NURSE ANESTHETIST, CERTIFIED REGISTERED

## 2017-08-25 PROCEDURE — 74011000250 HC RX REV CODE- 250

## 2017-08-25 PROCEDURE — 75410000000 HC DELIVERY VAGINAL/SINGLE: Performed by: OBSTETRICS & GYNECOLOGY

## 2017-08-25 PROCEDURE — 74011250636 HC RX REV CODE- 250/636: Performed by: ANESTHESIOLOGY

## 2017-08-25 PROCEDURE — 59025 FETAL NON-STRESS TEST: CPT | Performed by: OBSTETRICS & GYNECOLOGY

## 2017-08-25 PROCEDURE — 0KQM0ZZ REPAIR PERINEUM MUSCLE, OPEN APPROACH: ICD-10-PCS | Performed by: OBSTETRICS & GYNECOLOGY

## 2017-08-25 PROCEDURE — 75410000002 HC LABOR FEE PER 1 HR: Performed by: OBSTETRICS & GYNECOLOGY

## 2017-08-25 PROCEDURE — 99218 HC RM OBSERVATION: CPT

## 2017-08-25 RX ORDER — FENTANYL/BUPIVACAINE/NS/PF 2-1250MCG
1-16 PREFILLED PUMP RESERVOIR EPIDURAL CONTINUOUS
Status: DISCONTINUED | OUTPATIENT
Start: 2017-08-25 | End: 2017-08-26

## 2017-08-25 RX ORDER — BUPIVACAINE HYDROCHLORIDE 2.5 MG/ML
INJECTION, SOLUTION EPIDURAL; INFILTRATION; INTRACAUDAL AS NEEDED
Status: DISCONTINUED | OUTPATIENT
Start: 2017-08-25 | End: 2017-08-25 | Stop reason: HOSPADM

## 2017-08-25 RX ORDER — SIMETHICONE 80 MG
80 TABLET,CHEWABLE ORAL
Status: DISCONTINUED | OUTPATIENT
Start: 2017-08-25 | End: 2017-08-27 | Stop reason: HOSPADM

## 2017-08-25 RX ORDER — DIPHENHYDRAMINE HCL 25 MG
25 CAPSULE ORAL
Status: DISCONTINUED | OUTPATIENT
Start: 2017-08-25 | End: 2017-08-27 | Stop reason: HOSPADM

## 2017-08-25 RX ORDER — SWAB
1 SWAB, NON-MEDICATED MISCELLANEOUS DAILY
Status: DISCONTINUED | OUTPATIENT
Start: 2017-08-26 | End: 2017-08-27 | Stop reason: HOSPADM

## 2017-08-25 RX ORDER — METHYLERGONOVINE MALEATE 0.2 MG/ML
0.2 INJECTION INTRAVENOUS ONCE
Status: ACTIVE | OUTPATIENT
Start: 2017-08-25 | End: 2017-08-26

## 2017-08-25 RX ORDER — IBUPROFEN 800 MG/1
800 TABLET ORAL EVERY 8 HOURS
Status: DISCONTINUED | OUTPATIENT
Start: 2017-08-25 | End: 2017-08-27 | Stop reason: HOSPADM

## 2017-08-25 RX ORDER — HYDROCORTISONE ACETATE PRAMOXINE HCL 2.5; 1 G/100G; G/100G
CREAM TOPICAL AS NEEDED
Status: DISCONTINUED | OUTPATIENT
Start: 2017-08-25 | End: 2017-08-27 | Stop reason: HOSPADM

## 2017-08-25 RX ORDER — OXYTOCIN/RINGER'S LACTATE 20/1000 ML
125-500 PLASTIC BAG, INJECTION (ML) INTRAVENOUS ONCE
Status: ACTIVE | OUTPATIENT
Start: 2017-08-25 | End: 2017-08-26

## 2017-08-25 RX ORDER — NALOXONE HYDROCHLORIDE 0.4 MG/ML
0.4 INJECTION, SOLUTION INTRAMUSCULAR; INTRAVENOUS; SUBCUTANEOUS AS NEEDED
Status: DISCONTINUED | OUTPATIENT
Start: 2017-08-25 | End: 2017-08-25 | Stop reason: SDUPTHER

## 2017-08-25 RX ORDER — BISACODYL 5 MG
5 TABLET, DELAYED RELEASE (ENTERIC COATED) ORAL DAILY PRN
Status: DISCONTINUED | OUTPATIENT
Start: 2017-08-25 | End: 2017-08-27 | Stop reason: HOSPADM

## 2017-08-25 RX ORDER — HYDROCODONE BITARTRATE AND ACETAMINOPHEN 7.5; 325 MG/1; MG/1
1 TABLET ORAL
Qty: 24 TAB | Refills: 0 | Status: SHIPPED | OUTPATIENT
Start: 2017-08-25

## 2017-08-25 RX ORDER — ZOLPIDEM TARTRATE 5 MG/1
5 TABLET ORAL
Status: DISCONTINUED | OUTPATIENT
Start: 2017-08-25 | End: 2017-08-27 | Stop reason: HOSPADM

## 2017-08-25 RX ORDER — ONDANSETRON 4 MG/1
8 TABLET, ORALLY DISINTEGRATING ORAL
Status: DISCONTINUED | OUTPATIENT
Start: 2017-08-25 | End: 2017-08-27 | Stop reason: HOSPADM

## 2017-08-25 RX ORDER — HYDROMORPHONE HYDROCHLORIDE 1 MG/ML
1 INJECTION, SOLUTION INTRAMUSCULAR; INTRAVENOUS; SUBCUTANEOUS
Status: DISCONTINUED | OUTPATIENT
Start: 2017-08-25 | End: 2017-08-27 | Stop reason: HOSPADM

## 2017-08-25 RX ORDER — OXYTOCIN IN 5 % DEXTROSE 30/500 ML
PLASTIC BAG, INJECTION (ML) INTRAVENOUS
Status: DISPENSED
Start: 2017-08-25 | End: 2017-08-26

## 2017-08-25 RX ORDER — LIDOCAINE HYDROCHLORIDE AND EPINEPHRINE 20; 5 MG/ML; UG/ML
INJECTION, SOLUTION EPIDURAL; INFILTRATION; INTRACAUDAL; PERINEURAL AS NEEDED
Status: DISCONTINUED | OUTPATIENT
Start: 2017-08-25 | End: 2017-08-25 | Stop reason: HOSPADM

## 2017-08-25 RX ORDER — HYDROCODONE BITARTRATE AND ACETAMINOPHEN 10; 325 MG/1; MG/1
1 TABLET ORAL
Status: DISCONTINUED | OUTPATIENT
Start: 2017-08-25 | End: 2017-08-26

## 2017-08-25 RX ORDER — NALOXONE HYDROCHLORIDE 0.4 MG/ML
0.4 INJECTION, SOLUTION INTRAMUSCULAR; INTRAVENOUS; SUBCUTANEOUS AS NEEDED
Status: DISCONTINUED | OUTPATIENT
Start: 2017-08-25 | End: 2017-08-27 | Stop reason: HOSPADM

## 2017-08-25 RX ADMIN — BUPIVACAINE HYDROCHLORIDE 8 ML: 2.5 INJECTION, SOLUTION EPIDURAL; INFILTRATION; INTRACAUDAL at 13:08

## 2017-08-25 RX ADMIN — LIDOCAINE HYDROCHLORIDE AND EPINEPHRINE 3 ML: 20; 5 INJECTION, SOLUTION EPIDURAL; INFILTRATION; INTRACAUDAL; PERINEURAL at 13:07

## 2017-08-25 RX ADMIN — IBUPROFEN 800 MG: 800 TABLET, FILM COATED ORAL at 18:30

## 2017-08-25 RX ADMIN — FENTANYL 0.2 MG/100ML-BUPIV 0.125%-NACL 0.9% EPIDURAL INJ 10 ML/HR: 2/0.125 SOLUTION at 13:19

## 2017-08-25 RX ADMIN — SODIUM CHLORIDE, SODIUM LACTATE, POTASSIUM CHLORIDE, AND CALCIUM CHLORIDE 1000 ML: 600; 310; 30; 20 INJECTION, SOLUTION INTRAVENOUS at 12:37

## 2017-08-25 NOTE — IP AVS SNAPSHOT
[address] [place] [address] 
[phone] Patient: Arlene Agee MRN: [id number] :1995 You are allergic to the following No active allergies Recent Documentation Height Weight Breastfeeding? BMI OB Status Smoking Status 1.651 m 92.1 kg Unknown 33.78 kg/m2 Recent pregnancy Never Smoker Unresulted Labs Order Current Status SAMPLE TO BLOOD BANK In process Emergency Contacts Name Discharge Info Relation Home Work Mobile [facility] DISCHARGE CAREGIVER [3] Father [15] [phone] Quinton Lynne  Mother [14] [phone] Quinton Lynne  Parent [1] [phone] About your hospitalization You were admitted on:  2017 You last received care in the:  [facility] 3 MOTHER INFANT You were discharged on:  2017 Unit phone number:  [phone] Why you were hospitalized Your primary diagnosis was:  Not on File Your diagnoses also included:  Pregnancy, Pregnant Providers Seen During Your Hospitalizations Provider Role Specialty Primary office phone Eliezer Kerr MD Attending Provider Obstetrics & Gynecology [phone] Your Primary Care Physician (PCP) Primary Care Physician Office Phone Office Fax NONE ** None ** ** None ** Follow-up Information Follow up With Details Comments Contact Info In 6 weeks None   None (395) Patient stated that they have no PCP Current Discharge Medication List  
  
START taking these medications Dose & Instructions Dispensing Information Comments Morning Noon Evening Bedtime HYDROcodone-acetaminophen 7.5-325 mg per tablet Commonly known as:  Nan Koenig Your last dose was: Your next dose is:    
   
   
 Dose:  1 Tab Take 1 Tab by mouth every six (6) hours as needed for Pain. Max Daily Amount: 4 Tabs. Quantity:  24 Tab Refills:  0 CONTINUE these medications which have NOT CHANGED Dose & Instructions Dispensing Information Comments Morning Noon Evening Bedtime AMBULATORY BREAST PUMP Your last dose was: Your next dose is:    
   
   
 Use as directed Quantity:  1 Pump(s) Refills:  0 PRENATAL VITAMIN PO Your last dose was: Your next dose is: Take  by mouth. Refills:  0 STOP taking these medications   
 ferrous sulfate 325 mg (65 mg iron) tablet Where to Get Your Medications Information on where to get these meds will be given to you by the nurse or doctor. ! Ask your nurse or doctor about these medications HYDROcodone-acetaminophen 7.5-325 mg per tablet Discharge Instructions POST VAGINAL DELIVERY DISCHARGE INSTRUCTIONS Name: Peggy Reyes YOB: 1995 Primary Diagnosis: Active Problems: 
  Pregnancy (8/25/2017) Pregnant (8/25/2017) General:  
 
Diet/Diet Restrictions: 
Drink plenty of fluids daily (water, juices); avoid excessive caffeine intake. Eat and drink your normal diet. Medications:  
See list 
 
Physical Activity / Restrictions / Safety:  
 
Avoid heavy lifting, no more that 15 lbs. For 2-3 weeks; may use of stairs with assistance first few times. No driving until no pain and off pain meds with narcotics. Avoid intercourse 4-6 weeks, no douching or tampon use. You may walk but check with obstetrician before starting or resuming an exercise program.    
 
 
Discharge Instructions/Special Treatment/Home Care Needs:  
 
Continue prenatal vitamins. Continue to use squirt bottle with warm water on your episiotomy for comfort after each bathroom use as desired. Call the office for the following:  
 
Fever over 101 degrees by mouth. Vaginal bleeding heavier than a normal menstrual period or clots larger than a golf ball. Red streaks or increased swelling of legs, painful red streaks on your breast. 
Painful urination, constipation and increased pain or swelling or discharge with your incision. Pain Management:  
 
Pain Management:  
Take Acetaminophen (Tylenol) or Ibuprofen (Advil, Motrin), as directed for pain. Use a warm Sitz bath 3 times daily to relieve episiotomy or hemorrhoidal discomfort. For hemorrhoidal discomfort, use Tucks and Anusol cream as needed and directed. Follow-Up Care:  
 
Appointment with MD: Follow-up Appointments Procedures  FOLLOW UP VISIT Appointment in: 6 Weeks Standing Status:   Standing Number of Occurrences:   1 Order Specific Question:   Appointment in Answer:   6 Weeks Telephone number: [phone] Signed By: Meghana Gross MD                                                                                                   Date: 8/25/2017 Time: 3:46 PM 
 
 
 
Discharge Orders None [employer] Announcement We are excited to announce that we are making your provider's discharge notes available to you in [employer]. You will see these notes when they are completed and signed by the physician that discharged you from your recent hospital stay. If you have any questions or concerns about any information you see in [employer], please call the Health Information Department where you were seen or reach out to your Primary Care Provider for more information about your plan of care. Introducing [facility]! Dear Sofya Mclean: Thank you for requesting a [employer] account. Our records indicate that you already have an active [employer] account. You can access your account anytime at [email or web address] Did you know that you can access your hospital and ER discharge instructions at any time in [employer]?   You can also review all of your test results from your hospital stay or ER visit. Additional Information If you have questions, please visit the Frequently Asked Questions section of the [employer] website at [email or web address] Remember, MyChart is NOT to be used for urgent needs. For medical emergencies, dial 911. Now available from your iPhone and Android! General Information Please provide this summary of care documentation to your next provider. Patient Signature:  ____________________________________________________________ Date:  ____________________________________________________________  
  
[facility] Provider Signature:  ____________________________________________________________ Date:  ____________________________________________________________

## 2017-08-25 NOTE — PROGRESS NOTES
Patient up and ambulated to the bathroom, voided without difficulty, 1000cc lochia tinged urine. Kelsey care completed. Linens changed. Patient returned to bed. 1800; patient up and ambulated to the bathroom and voided without difficulty. pericare completed independently and patient returned to bed.

## 2017-08-25 NOTE — PROGRESS NOTES
admitted under the care of Dr. Josh Cabral with contractions. Contractions woke patient up at 2345 last evening. With exam, watery discharge with white flecks present.

## 2017-08-25 NOTE — ANESTHESIA PROCEDURE NOTES
Epidural Block    Start time: 8/25/2017 12:50 PM  End time: 8/25/2017 1:09 PM  Performed by: Jackelin Artisnt  Authorized by: Gerardo Gandhi     Pre-Procedure  Indication: labor epidural    Preanesthetic Checklist: patient identified, risks and benefits discussed, anesthesia consent, site marked, timeout performed and anesthesia consent    Timeout Time: 12:50        Epidural:   Patient position:  Seated  Prep region:  Lumbar  Prep: Betadine    Location:  L2-3    Needle and Epidural Catheter:   Needle Type:  Tuohy  Needle Gauge:  17 G  Injection Technique:  Loss of resistance using air  Attempts:  2  Catheter Size:  18 G  Catheter at Skin Depth (cm):  12  Depth in Epidural Space (cm):  6  Events: no paresthesia and negative aspiration test    Test Dose:  Lidocaine 1.5% w/ epi and negative    Assessment:   Catheter Secured:  Tegaderm and tape  Insertion:  Uncomplicated  Patient tolerance:  Patient tolerated the procedure well with no immediate complications

## 2017-08-25 NOTE — H&P
History & Physical    Name: Brie Roberts MRN: [id number]  SSN: [id number]    YOB: 1995  Age: 20 y.o. Sex: female        Subjective:     Estimated Date of Delivery: 17  OB History      Para Term  AB Living    4 1 1 0 2 1    SAB TAB Ectopic Molar Multiple Live Births    2 0 0  0 1          Ms. Sharmin Rodriguez is admitted with pregnancy at 36w3d for active labor. Prenatal course was normal. Please see prenatal records for details. Group B Strep was negative. Past Medical History:   Diagnosis Date    Asthma      Past Surgical History:   Procedure Laterality Date    HX DILATION AND CURETTAGE  08/10/2016    Missed AB     Social History     Occupational History    Not on file. Social History Main Topics    Smoking status: Never Smoker    Smokeless tobacco: Never Used    Alcohol use No    Drug use: No    Sexual activity: Yes     Partners: Male     Birth control/ protection: None     Family History   Problem Relation Age of Onset    Hypertension Brother     Stroke Paternal Grandmother     Diabetes Maternal Grandmother     No Known Problems Mother     No Known Problems Father        No Known Allergies  Prior to Admission medications    Medication Sig Start Date End Date Taking? Authorizing Provider   AMBULATORY BREAST PUMP Use as directed 17   Qian Ponce MD   ferrous sulfate 325 mg (65 mg iron) tablet Take 1 Tab by mouth two (2) times a day. 17   Qian Ponce MD   PRENATAL VIT W-CA,FE,FA,<1 MG, (PRENATAL VITAMIN PO) Take  by mouth. Historical Provider        Review of Systems: A comprehensive review of systems was negative except for that written in the HPI. Objective:     Vitals: There were no vitals filed for this visit.      Physical Exam:  Abdomen: soft, nontender  Fundus: soft and non tender  Perineum: blood absent, amniotic fluid present  Cervical Exam: 3/75 %/-1 (F.King FRANCOIS)/   Lower Extremities:  - Edema 1+  Membranes:  Forebag ruptured for moderate clear fluid  Fetal Heart Rate: Reactive    Prenatal Labs:   Lab Results   Component Value Date/Time    Rubella, External 4.20 01/06/2017    GrBStrep, External Negative 07/26/2017    HBsAg, External Negative 01/06/2017    HIV, External Negative 01/06/2017    Gonorrhea, External Negative 01/06/2017    Chlamydia, External Negative 01/06/2017        Assessment/Plan:     Plan: Reassuring fetal status, Continue plan for vaginal delivery.       Signed By:  Felix Sparrow MD     August 25, 2017

## 2017-08-25 NOTE — PROGRESS NOTES
Bedside and Verbal shift change report given to [facility] (oncoming nurse) by Hector Dukes RN (offgoing nurse). Report included the following information SBAR, Kardex, Recent Results and Med Rec Status.

## 2017-08-25 NOTE — ANESTHESIA PREPROCEDURE EVALUATION
Anesthetic History   No history of anesthetic complications            Review of Systems / Medical History  Patient summary reviewed, nursing notes reviewed and pertinent labs reviewed    Pulmonary            Asthma : well controlled    Comments: Remote history asthma. Neuro/Psych   Within defined limits           Cardiovascular  Within defined limits                Exercise tolerance: >4 METS     GI/Hepatic/Renal  Within defined limits           Pertinent negatives: No GERD   Endo/Other  Within defined limits           Other Findings              Physical Exam    Airway  Mallampati: II  TM Distance: 4 - 6 cm  Neck ROM: normal range of motion        Cardiovascular  Regular rate and rhythm,  S1 and S2 normal,  no murmur, click, rub, or gallop  Rhythm: regular  Rate: normal         Dental  No notable dental hx       Pulmonary  Breath sounds clear to auscultation               Abdominal  GI exam deferred       Other Findings            Anesthetic Plan    ASA: 2  Anesthesia type: epidural      Post-op pain plan if not by surgeon: indwelling epidural catheter and intrathecal opiates      Anesthetic plan and risks discussed with: Patient and Family      Procedure discussed with patient prior to placement. Patient ready for anesthesia.

## 2017-08-25 NOTE — IP AVS SNAPSHOT
Summary of Care Report The Summary of Care report has been created to help improve care coordination. Users with access to [employer] or [address] (Web-based application) may access additional patient information including the Discharge Summary. If you are not currently a [address] user and need more information, please call the number listed below in the [place] section and ask to be connected with Medical Records. Facility Information Name Address Phone [place] [phone] Patient Information Patient Name Sex  Agnes Tucker ([id number]) Female 1995 Discharge Information Admitting Provider Service Area Unit Moo Medeiros MD / Hugh Nicholas [address] [facility] 3 Mother Infant / [phone] Discharge Provider Discharge Date/Time Discharge Disposition Destination (none) 2017 Morning (Pending) AHR (none) Patient Language Language ENGLISH [13] Hospital Problems as of 2017  Reviewed: 2015  1:32 PM by Lashon Henry MD  
  
  
  
 Class Noted - Resolved Last Modified POA Active Problems Pregnancy  2017 - Present 2017 by Moo Medeiros MD Unknown Entered by Moo Medeiros MD  
  Pregnant  2017 - Present 2017 by Moo Medeiros MD Unknown Entered by Moo Medeiros MD  
  
Non-Hospital Problems as of 2017  Reviewed: 2015  1:32 PM by Lashon Henry MD  
  
  
  
 Class Noted - Resolved Last Modified Active Problems Other normal pregnancy, not first  2015 - Present 2017 by Moo Medeiros MD  
  Entered by Moo Medeiros MD  
  Overview Signed 2017  2:52 PM by Moo Medeiros MD  
   No problems   Missed menses  2017 - Present 2017 by Moo Medeiros MD  
  Entered by Moo Medeiros MD  
 Positive pregnancy test  1/6/2017 - Present 1/6/2017 by Per Taylor MD  
  Entered by Per Taylor MD  
  
You are allergic to the following No active allergies Current Discharge Medication List  
  
START taking these medications Dose & Instructions Dispensing Information Comments HYDROcodone-acetaminophen 7.5-325 mg per tablet Commonly known as:  Catie Cervantes Dose:  1 Tab Take 1 Tab by mouth every six (6) hours as needed for Pain. Max Daily Amount: 4 Tabs. Quantity:  24 Tab Refills:  0 CONTINUE these medications which have NOT CHANGED Dose & Instructions Dispensing Information Comments AMBULATORY BREAST PUMP Use as directed Quantity:  1 Pump(s) Refills:  0 PRENATAL VITAMIN PO Take  by mouth. Refills:  0 STOP taking these medications Comments  
 ferrous sulfate 325 mg (65 mg iron) tablet Current Immunizations Name Date Influenza Vaccine 1/23/2015 Influenza Vaccine (Quad) PF 1/6/2017 Tdap 6/16/2017, 7/15/2015 Surgery Information ID Date/Time Status Primary Surgeon All Procedures Location [id number] 8/25/2017 Complete   ZZANESTHESIA [facility] - DO NOT SCHEDULE Follow-up Information Follow up With Details Comments Contact Info In 6 weeks None   None (395) Patient stated that they have no PCP Discharge Instructions POST VAGINAL DELIVERY DISCHARGE INSTRUCTIONS Name: Peggy Reyes YOB: 1995 Primary Diagnosis: Active Problems: 
  Pregnancy (8/25/2017) Pregnant (8/25/2017) General:  
 
Diet/Diet Restrictions: 
Drink plenty of fluids daily (water, juices); avoid excessive caffeine intake. Eat and drink your normal diet. Medications:  
See list 
 
Physical Activity / Restrictions / Safety:  
 
Avoid heavy lifting, no more that 15 lbs.  For 2-3 weeks; may use of stairs with assistance first few times. No driving until no pain and off pain meds with narcotics. Avoid intercourse 4-6 weeks, no douching or tampon use. You may walk but check with obstetrician before starting or resuming an exercise program.    
 
 
Discharge Instructions/Special Treatment/Home Care Needs:  
 
Continue prenatal vitamins. Continue to use squirt bottle with warm water on your episiotomy for comfort after each bathroom use as desired. Call the office for the following:  
 
Fever over 101 degrees by mouth. Vaginal bleeding heavier than a normal menstrual period or clots larger than a golf ball. Red streaks or increased swelling of legs, painful red streaks on your breast. 
Painful urination, constipation and increased pain or swelling or discharge with your incision. Pain Management:  
 
Pain Management:  
Take Acetaminophen (Tylenol) or Ibuprofen (Advil, Motrin), as directed for pain. Use a warm Sitz bath 3 times daily to relieve episiotomy or hemorrhoidal discomfort. For hemorrhoidal discomfort, use Tucks and Anusol cream as needed and directed. Follow-Up Care:  
 
Appointment with MD: Follow-up Appointments Procedures  FOLLOW UP VISIT Appointment in: 6 Weeks Standing Status:   Standing Number of Occurrences:   1 Order Specific Question:   Appointment in Answer:   6 Weeks Telephone number: [phone] Signed By: Gino Tabares MD                                                                                                   Date: 8/25/2017 Time: 3:46 PM 
 
 
 
Chart Review Routing History Recipient Method Report Sent By Abby Tabares MD  
Phone: [phone] In [facility] CUSTOM LAB REPORT Ramon Rosas [[id number]] 1/26/2015  8:45 AM 01/23/2015 Gino Tabares MD  
Phone: [phone] In [facility] CUSTOM LAB REPORT Ramon Rosas [[id number]] 3/27/2017 10:38 AM 3/24/2017

## 2017-08-25 NOTE — PROGRESS NOTES
1459- Infant moved to warmer, assesed. 1529- Temperature improved- returned skin to skin with mom. Bedside SBAR report given to Ambreen Atkinson RN, who was already at the bedside- Breanna Carlos will spot check blood glucose.

## 2017-08-25 NOTE — DISCHARGE SUMMARY
Obstetrical Discharge Summary     Name: Ajith Phillips MRN: [id number]  SSN: [id number]    YOB: 1995  Age: 20 y.o. Sex: female      Admit Date: 2017    Discharge Date: 2017     Admitting Physician: Tyra Ulloa MD     Attending Physician:  Tyra Ulloa MD     Admission Diagnoses: Pregnancy;Pregnancy;Pregnant    Discharge Diagnoses:   Information for the patient's :  Richelle Phelan, Male [[id number]]   Delivery of a 8 lb 1.5 oz (3.67 kg) male infant via  on 2017 at 2:29 PM  by . Apgars were  and . Additional Diagnoses:   Hospital Problems  Date Reviewed: 2015          Codes Class Noted POA    Pregnancy ICD-10-CM: Z33.1  ICD-9-CM: V22.2  2017 Unknown        Pregnant ICD-10-CM: Z33.1  ICD-9-CM: V22.2  2017 Unknown             Lab Results   Component Value Date/Time    Rubella, External 4.20 2017    GrBStrep, External Negative 2017       Immunization(s):   Immunization History   Administered Date(s) Administered    Influenza Vaccine 2015    Influenza Vaccine (Quad) PF 2017    Tdap 07/15/2015, 2017        Hospital Course: Normal hospital course following the delivery. The patient was released to her home in good condition. Patient Instructions:     Reference my discharge instructions.       Follow-up Appointments   Procedures    FOLLOW UP VISIT Appointment in: 6 Weeks     Standing Status:   Standing     Number of Occurrences:   1     Order Specific Question:   Appointment in     Answer:   6 Weeks        Signed By:  Tyra Ulloa MD     2017

## 2017-08-25 NOTE — LACTATION NOTE
This note was copied from a baby's chart. Assisted mother with latching baby to right side, mother holding baby skin to skin post . Baby latched well, mother actively leaking colostrum down chest.  Baby taken off breast due to low temp and placed under radiant warmer by CORE nurse. Reviewed BF basics with mother. Mother states she BF 1st child for 21 months. Discussed with mother her plan for feeding. Reviewed the benefits of exclusive breast milk feeding during the hospital stay. Informed her of the risks of using formula to supplement in the first few days of life as well as the benefits of successful breast milk feeding; referred her to the Breastfeeding booklet about this information. She acknowledges understanding of information reviewed and states that it is her plan to breastfeed her infant. Will support her choice and offer additional information as needed. Reviewed breastfeeding basics:  How milk is made and normal  breastfeeding behaviors discussed. Supply and demand,  stomach size, early feeding cues, skin to skin bonding with comfortable positioning and baby led latch-on reviewed. How to identify signs of successful breastfeeding sessions reviewed; education on assymetrical latch, signs of effective latching vs shallow, in-effective latching, normal  feeding frequency and duration and expected infant output discussed. Normal course of breastfeeding discussed including the AAP's recommendation that children receive exclusive breast milk feedings for the first six months of life with breast milk feedings to continue through the first year of life and/or beyond as complimentary table foods are added. Breastfeeding Booklet and Warm line information provided with discussion.   Discussed typical  weight loss and the importance of pediatrician appointment within 24-48 hours of discharge, at 2 weeks of life and normalcy of requesting pediatric weight checks as needed in between visits. Hand Expression Education:  Mom taught how to manually hand express her colostrum. Emphasized the importance of providing infant with valuable colostrum as infant rests skin to skin at breast.  Aware to avoid extended periods of non-feeding. Aware to offer 10-20+ drops of colostrum every 2-3 hours until infant is latching and nursing effectively. Taught the rationale behind this low tech but highly effective evidence based practice. Pt will successfully establish breastfeeding by feeding in response to early feeding cues   or wake every 3h, will obtain deep latch, and will keep log of feedings/output. Taught to BF at hunger cues and or q 2-3 hrs and to offer 10-20 drops of hand expressed colostrum at any non-feeds. Breast Assessment  Left Breast: Medium  Left Nipple:  Intact, Everted  Right Breast: Medium  Right Nipple: Everted, Intact  Breast- Feeding Assessment  Attends Breast-Feeding Classes: Yes  Breast-Feeding Experience: Yes (Bf 1st child for 21 months)  Breast Trauma/Surgery: No  Type/Quality: Good  Lactation Consultant Visits  Breast-Feedings: Good   Mother/Infant Observation  Mother Observation: Breast comfortable, Holds breast, Recognizes feeding cues  Infant Observation: Rhythmic suck, Relaxed after feeding, Opens mouth, Lips flanged, upper, Lips flanged, lower, Feeding cues, Audible swallows  LATCH Documentation  Latch: Grasps breast, tongue down, lips flanged, rhythmic sucking  Audible Swallowing: Spontaneous and intermittent (24 hours old) (mother actively leaking colostum, running down chest)  Type of Nipple: Everted (after stimulation)  Comfort (Breast/Nipple): Soft/non-tender  Hold (Positioning): Full assist, teach one side, mother does other, staff holds  [facility] Score: 9

## 2017-08-25 NOTE — PROGRESS NOTES
1018; patient up to the bathroom to shower, dr. Zoie Denis said ok to let patient labor in shower. [phone]; patient back to bed to be monitored.

## 2017-08-25 NOTE — IP AVS SNAPSHOT
[address] 
[phone] Patient: Rolan Balderas MRN: [id number] :1995 Current Discharge Medication List  
  
START taking these medications Dose & Instructions Dispensing Information Comments Morning Noon Evening Bedtime HYDROcodone-acetaminophen 7.5-325 mg per tablet Commonly known as:  Veronika Chetna Your last dose was: Your next dose is:    
   
   
 Dose:  1 Tab Take 1 Tab by mouth every six (6) hours as needed for Pain. Max Daily Amount: 4 Tabs. Quantity:  24 Tab Refills:  0 CONTINUE these medications which have NOT CHANGED Dose & Instructions Dispensing Information Comments Morning Noon Evening Bedtime AMBULATORY BREAST PUMP Your last dose was: Your next dose is:    
   
   
 Use as directed Quantity:  1 Pump(s) Refills:  0 PRENATAL VITAMIN PO Your last dose was: Your next dose is: Take  by mouth. Refills:  0 STOP taking these medications   
 ferrous sulfate 325 mg (65 mg iron) tablet Where to Get Your Medications Information on where to get these meds will be given to you by the nurse or doctor. ! Ask your nurse or doctor about these medications HYDROcodone-acetaminophen 7.5-325 mg per tablet

## 2017-08-25 NOTE — L&D DELIVERY NOTE
Delivery Summary    Patient: Brie Roberts MRN: [id number]  SSN: [id number]    YOB: 1995  Age: 20 y.o. Sex: female        Labor Events:    Labor: No    Rupture Date: 2017    Rupture Time: 8:23 AM    Rupture Type AROM    Amniotic Fluid Volume:      Amniotic Fluid Description: Clear  None    Induction: None        Augmentation: None    Labor Complications: None     Additional Complications:        Cervical Ripening:       None      Delivery Events:  Episiotomy:   none   Laceration(s): 1st degree bilateral and 2nd degree midline   Repaired: yes   Number of Repair Packets: 1   Suture Type and Size: 2-0 chromic   Estimated Blood Loss (ml):   300       Information for the patient's :  Philipp Rivera [[id number]]     Delivery Summary - Baby    Delivery Date: 2017   Delivery Time: 2:29 PM   Delivery Type:    Sex:  male  Gestational Age: 36w3d  Delivery Clinician:     Living?:     Delivery Location:               [facility]  One minute Five minutes Ten minutes   Skin Color:            Heart Rate:             Reflex Irritability:             Muscle Tone:           Respiration:             Total:               Presentation:    Position:        Resuscitation Method:        Meconium Stained:      Cord Information:     Complications:    Cord Blood Sent?:       Blood Gases Sent?:       Placenta:  Date/Time:    Removal:     assisted   Appearance: Normal and intact/uterus explored and negative     [place] Measurements:  Birth Weight:      Birth Length:     Head Circumference:       Chest Circumference:      Abdominal Girth:       Other Providers:     Obstetrician;Primary Nurse;Primary  Nurse;Nicu Nurse;Neonatologist;Anesthesiologist;Crna;Nurse Practitioner;Midwife;Nursery Nurse           Cord Blood Results:  Information for the patient's :  Philipp Rivera [[id number]]   No results found for: Kalin Luis, PCTDIG, BILI, ABORHEXT, [address]    Information for the patient's :  Sharmin Rodriugez Male [[id number]]   No results found for: APH, APCO2, APO2, AHCO3, ABEC, ABDC, O2ST, SITE, [place], PHI, [place], PO2I, HCO3I, SO2I, IBD     Information for the patient's :  Erasmo Krause, Male [[id number]]   No results found for: EPHV, PCO2V, PO2V, HCO3V, O2STV, EBDV

## 2017-08-26 PROCEDURE — 65270000029 HC RM PRIVATE

## 2017-08-26 PROCEDURE — 74011250637 HC RX REV CODE- 250/637: Performed by: OBSTETRICS & GYNECOLOGY

## 2017-08-26 PROCEDURE — 77030036554

## 2017-08-26 RX ORDER — SODIUM CHLORIDE, SODIUM LACTATE, POTASSIUM CHLORIDE, CALCIUM CHLORIDE 600; 310; 30; 20 MG/100ML; MG/100ML; MG/100ML; MG/100ML
125 INJECTION, SOLUTION INTRAVENOUS CONTINUOUS
Status: DISCONTINUED | OUTPATIENT
Start: 2017-08-26 | End: 2017-08-27 | Stop reason: HOSPADM

## 2017-08-26 RX ORDER — BUTALBITAL, ACETAMINOPHEN AND CAFFEINE 50; 325; 40 MG/1; MG/1; MG/1
2 TABLET ORAL
Status: DISCONTINUED | OUTPATIENT
Start: 2017-08-26 | End: 2017-08-27 | Stop reason: HOSPADM

## 2017-08-26 RX ORDER — SODIUM CHLORIDE 0.9 % (FLUSH) 0.9 %
5-10 SYRINGE (ML) INJECTION EVERY 8 HOURS
Status: DISCONTINUED | OUTPATIENT
Start: 2017-08-26 | End: 2017-08-27

## 2017-08-26 RX ORDER — SODIUM CHLORIDE 0.9 % (FLUSH) 0.9 %
5-10 SYRINGE (ML) INJECTION AS NEEDED
Status: DISCONTINUED | OUTPATIENT
Start: 2017-08-26 | End: 2017-08-27 | Stop reason: HOSPADM

## 2017-08-26 RX ORDER — OXYTOCIN IN 5 % DEXTROSE 30/500 ML
2 PLASTIC BAG, INJECTION (ML) INTRAVENOUS
Status: DISCONTINUED | OUTPATIENT
Start: 2017-08-26 | End: 2017-08-27 | Stop reason: HOSPADM

## 2017-08-26 RX ADMIN — BUTALBITAL, ACETAMINOPHEN, AND CAFFEINE 2 TABLET: 50; 325; 40 TABLET ORAL at 17:57

## 2017-08-26 RX ADMIN — IBUPROFEN 800 MG: 800 TABLET, FILM COATED ORAL at 13:40

## 2017-08-26 RX ADMIN — IBUPROFEN 800 MG: 800 TABLET, FILM COATED ORAL at 22:11

## 2017-08-26 RX ADMIN — IBUPROFEN 800 MG: 800 TABLET, FILM COATED ORAL at 04:45

## 2017-08-26 RX ADMIN — MUPIROCIN: 20 OINTMENT TOPICAL at 17:57

## 2017-08-26 RX ADMIN — BUTALBITAL, ACETAMINOPHEN, AND CAFFEINE 2 TABLET: 50; 325; 40 TABLET ORAL at 10:20

## 2017-08-26 NOTE — PROGRESS NOTES
19 Indra Torres received from [facility].    1829  Admission assessment performed on patient. Patient and FOB oriented to room and unit and all questions answered. 5  Dr. Wing Pulliam (anesthesiologist) to see patient for spinal headache rule out. 0757  Patient has decided to wait to see how her head feels before opting for a blood patch (refer to note from Dr. Haley Elam). No needs at this time.

## 2017-08-26 NOTE — LACTATION NOTE
This note was copied from a baby's chart. Assisted mother with positioning baby at breast in biological position. Baby latched deeply, rhythmic sucking noted with audible swallows. Mother states BF is going well, denies questions or needs at this time. Reviewed breastfeeding techniques and positions with mother until found a position she was most comfortable with. Reminded mother of early feeding cues and that breast fed infants should be fed on demand without time restriction on the first breast until the infant seems satisfied. Then the second breast is offered. Advised mother to awaken  to feed if three hours have passed since baby last ate. Will continue to monitor mother's progress with breastfeeding and offer assistance at any time. Pt will successfully establish breastfeeding by feeding in response to early feeding cues   or wake every 3h, will obtain deep latch, and will keep log of feedings/output. Taught to BF at hunger cues and or q 2-3 hrs and to offer 10-20 drops of hand expressed colostrum at any non-feeds.       Breast Assessment  Left Breast: Medium  Left Nipple: Everted, Intact  Right Breast: Medium  Right Nipple: Everted, Intact  Breast- Feeding Assessment  Attends Breast-Feeding Classes: Yes  Breast-Feeding Experience: Yes (Bf 1st child for 21 months)  Breast Trauma/Surgery: No  Type/Quality: Good  Lactation Consultant Visits  Breast-Feedings: Good   Mother/Infant Observation  Mother Observation: Breast comfortable, Recognizes feeding cues  Infant Observation: Rhythmic suck, Feeding cues  LATCH Documentation  Latch: Grasps breast, tongue down, lips flanged, rhythmic sucking  Audible Swallowing: Spontaneous and intermittent (24 hours old)  Type of Nipple: Everted (after stimulation)  Comfort (Breast/Nipple): Soft/non-tender  Hold (Positioning): Full assist, teach one side, mother does other, staff holds (assisted mother with biological positioning)  LATCH Score: 9

## 2017-08-26 NOTE — PROGRESS NOTES
SBAR report received from Abiola Canchola RN  7028- Mother assessment completed WDL, mother planning to nurse infant no needs at this time  18- Infant bathed with mother in room, family visiting, no needs at this time  0- Mother visiting with family and resting in bed  1900-Bedside and Verbal shift change report given to Pascual Rowe RN (oncoming nurse) by Almita Hatch RN (offgoing nurse). Report included the following information SBAR, Kardex, Intake/Output, MAR and Recent Results.

## 2017-08-26 NOTE — PROGRESS NOTES
Post-Partum Day Number 1 Progress Note    Patient doing well post-partum with c/o HA that worsens with sitting and standing and improves when lying down. Voiding withour difficulty, normal lochia. Vitals:  Patient Vitals for the past 8 hrs:   BP Temp Pulse Resp   17 0842 111/74 98.1 °F (36.7 °C) 76 16   17 0418 115/64 98.2 °F (36.8 °C) 80 18     Temp (24hrs), Av.5 °F (36.9 °C), Min:98.1 °F (36.7 °C), Max:98.9 °F (37.2 °C)      Vital signs stable, afebrile. Exam:  Patient without distress. Abdomen soft, fundus firm at level of umbilicus, nontender               Perineum with normal lochia noted. Lower extremities are negative for swelling, cords or tenderness. Lab/Data Review:  A pos/rub imm/rpr nr    Assessment and Plan:  Patient appears to be having HA that is possibly a spinal HA, post-partum course otherwise uncomplicated. Fioricet and Motrin prn HA and if no improvement will consult anesthesia  TDAP received 2017  Boy- no circumcision  Continue routine perineal care and maternal education. Plan discharge tomorrow if no problems occur.

## 2017-08-26 NOTE — PROGRESS NOTES
Bedside and Verbal shift change report given to Stefanie Zuniga (oncoming nurse) by JAIDEN Mcdowell RN/BORA Rudd RN (offgoing nurse). Report included the following information SBAR, Kardex, Intake/Output and MAR.

## 2017-08-26 NOTE — PROGRESS NOTES
Bedside shift change report given to Vivienne Victor RN (oncoming nurse) by Roxanne Taylor RN (offgoing nurse). Report included the following information SBAR, Kardex, Intake/Output and MAR.

## 2017-08-26 NOTE — PROGRESS NOTES
Bedside and Verbal shift change report given to Billie Saavedra RN (oncoming nurse) by Becky Moy RN (offgoing nurse). Report included the following information SBAR, Kardex, Procedure Summary, Intake/Output, MAR, Recent Results and Med Rec Status.

## 2017-08-26 NOTE — PROGRESS NOTES
Patient complaining of low back, neck and frontal headache that worsens in standing position. Started yesterday and has been slightly better today. Improves in the supine position. Had difficult epidural placement per patient. Discussed options of conservative versus epidural blood patch. At this point, patient prefers to use conservative measures: fluids, caffeine, abdominal binder.

## 2017-08-27 VITALS
BODY MASS INDEX: 33.82 KG/M2 | RESPIRATION RATE: 16 BRPM | HEART RATE: 86 BPM | WEIGHT: 203 LBS | DIASTOLIC BLOOD PRESSURE: 68 MMHG | OXYGEN SATURATION: 93 % | TEMPERATURE: 98.2 F | SYSTOLIC BLOOD PRESSURE: 122 MMHG | HEIGHT: 65 IN

## 2017-08-27 PROCEDURE — 74011250637 HC RX REV CODE- 250/637: Performed by: OBSTETRICS & GYNECOLOGY

## 2017-08-27 RX ADMIN — MUPIROCIN: 20 OINTMENT TOPICAL at 09:43

## 2017-08-27 RX ADMIN — IBUPROFEN 800 MG: 800 TABLET, FILM COATED ORAL at 06:31

## 2017-08-27 NOTE — LACTATION NOTE
Problem: Lactation Care Plan  Goal: *Infant latching appropriately  Outcome: Resolved/Met Date Met:  08/27/17  Mom ready for discharge. States nursing going well.  Jone Gunn  Not seen at breast this am.  Goal: *Weight loss less than 10% of birth weight  Outcome: Resolved/Met Date Met:  08/27/17  Encouraged mom to attempt feeding with baby led feeding cues. Just as sucking on fingers, rooting, mouthing. Looking for 8-12 feedings in 24 hours. Don't limit baby at breast, allow baby to come of breast on it's own. Baby may want to feed  often and may increase number of feedings on second day of life. Skin to skin encouraged.        If baby doesn't nurse,  Mom should  Pump and give infant any expressed milk. If not pumping any milk, mom should contact pediatrician for possible need for supplementation.          Problem: Patient Education: Go to Patient Education Activity  Goal: Patient/Family Education  Outcome: Resolved/Met Date Met:  08/27/17  Discussed eating a healthy diet. Instructed mother to eat a variety of foods in order to get a well balanced diet. She should consume an extra 300-500 calories per day (more than her non-pregnant requirement.) These extra calories will help provide energy needed for optimal breast milk production. Mother also encouraged to \"drink to thirst\" and it is recommended that she drink fluids such as water and fruit/vegetable juice. Nutritious snacks should be available so that she can eat throughout the day to help satisfy her hunger and maintain a good milk supply. Continue taking your prenatal vitamins as long as you breast feed.       Chart shows numerous feedings, void, stool WNL. Discussed Importance of monitoring outputs and feedings on first week of  Breastfeeding.   Discussed ways to tell if baby getting enough, ie  Voids and stools, by day 7, baby should have at least  4-6 wet diapers a day, change in color of stool to a seedy yellow, and return to birth wt within 2 weeks with a steady increase after that. .  Follow up with pediatrician visit for weight check in 1-2 days reviewed. Discussed Breast feeding support groups and encouraged to call warm line number, [phone] or [facility] at [phone]  for any questions/problems that arise.       Encouraged mom to attempt feeding with baby led feeding cues. Just as sucking on fingers, rooting, mouthing. Looking for 8-12 feedings in 24 hours. Don't limit baby at breast, allow baby to come of breast on it's own. Baby may want to feed  often and may increase number of feedings on second day of life. Skin to skin encouraged.        If baby doesn't nurse,  Mom should  Pump and give infant any expressed milk. If not pumping any milk, mom should contact pediatrician for possible need for supplementation.          Engorgement Care Guidelines:  Anticipatory guidance shared. Reviewed how milk is made and normal phases of milk production. Taught care of engorged breasts -and how to help. If mom should experience engorged breas frequent breastfeeding encouraged, cool packs and motrin as tolerated.   Rupesh Hussein      Call your doctor, midwife and/or lactation consultant if:  · Baby is having no wet or dirty diapers   · Baby has dark colored urine after day 3  (should be pale yellow to clear)   · Baby has dark colored stools after day 4  (should be mustard yellow, with no meconium)   · Baby has fewer wet/soiled diapers or nurses less   frequently than the goals listed here   · Mom has symptoms of mastitis   (sore breast with fever, chills, flu-like aching)

## 2017-08-27 NOTE — PROGRESS NOTES
Post-Partum Day Number 2 Progress Note    Rocío Zondra Pavy     Information for the patient's :  Mei Verde, Male [[id number]]    Patient doing well without significant complaint. Voiding without difficulty, normal lochia. Vitals:  Visit Vitals    /68 (BP 1 Location: Left arm, BP Patient Position: At rest)    Pulse 86    Temp 98.2 °F (36.8 °C)    Resp 16    Ht 5' 5\" (1.651 m)    Wt 203 lb (92.1 kg)    LMP 2016    SpO2 93%    Breastfeeding Unknown    BMI 33.78 kg/m2     Temp (24hrs), Av.4 °F (36.9 °C), Min:97.8 °F (36.6 °C), Max:99.1 °F (37.3 °C)      Exam:         Patient without distress. Abdomen soft, fundus firm, nontender                 ower extremities are negative for swelling, cords or tenderness.     Labs:     Lab Results   Component Value Date/Time    WBC 7.6 2017 08:16 AM    WBC 8.5 2017 02:31 PM    WBC 8.8 2017 03:43 PM    WBC 5.4 08/10/2016 06:59 AM    WBC 9.0 2015 10:10 PM    WBC 8.8 2015 10:35 AM    WBC 7.6 2015 02:27 PM    WBC 7.6 2015 09:31 AM    HGB 9.8 2017 08:16 AM    HGB 9.6 2017 02:31 PM    HGB 10.8 2017 03:43 PM    HGB 11.7 08/10/2016 06:59 AM    HGB 11.1 2015 10:10 PM    HGB 9.3 2015 10:35 AM    HGB 10.1 2015 02:27 PM    HGB 10.8 2015 09:31 AM    HCT 31.0 2017 08:16 AM    HCT 29.9 2017 02:31 PM    HCT 34.6 2017 03:43 PM    HCT 35.2 08/10/2016 06:59 AM    HCT 33.9 2015 10:10 PM    HCT 30.1 2015 10:35 AM    HCT 31.6 2015 02:27 PM    HCT 33.4 2015 09:31 AM    PLATELET 122 7074 08:16 AM    PLATELET 387  02:31 PM    PLATELET 860  03:43 PM    PLATELET 571  06:59 AM    PLATELET 084  10:10 PM    PLATELET 483  10:35 AM    PLATELET 514  02:27 PM    PLATELET 389  09:31 AM    Hgb, External 9.6 2017    Hgb, External 10.8 2017    Hct, External 29.9 06/16/2017    Hct, External 34.6 01/06/2017    Platelet cnt., External 274 06/16/2017    Platelet cnt., External 335 01/06/2017       No results found for this or any previous visit (from the past 24 hour(s)). Assessment: Doing well, post partum day 2    Plan:   1. Discharge home today  2. Follow up in office in 6 weeks with Felix Sparrow MD  3. Post partum activity advised, diet as tolerated  4.  Discharge Medications: ibuprofen, percocet and medications prior to admission

## 2017-08-27 NOTE — DISCHARGE INSTRUCTIONS
POST VAGINAL DELIVERY DISCHARGE INSTRUCTIONS    Name: Cameron Justin  YOB: 1995  Primary Diagnosis: Active Problems:    Pregnancy (8/25/2017)      Pregnant (8/25/2017)        General:     Diet/Diet Restrictions:  Drink plenty of fluids daily (water, juices); avoid excessive caffeine intake. Eat and drink your normal diet. Medications:   See list    Physical Activity / Restrictions / Safety:     Avoid heavy lifting, no more that 15 lbs. For 2-3 weeks; may use of stairs with assistance first few times. No driving until no pain and off pain meds with narcotics. Avoid intercourse 4-6 weeks, no douching or tampon use. You may walk but check with obstetrician before starting or resuming an exercise program.         Discharge Instructions/Special Treatment/Home Care Needs:     Continue prenatal vitamins. Continue to use squirt bottle with warm water on your episiotomy for comfort after each bathroom use as desired. Call the office for the following:     Fever over 101 degrees by mouth. Vaginal bleeding heavier than a normal menstrual period or clots larger than a golf ball. Red streaks or increased swelling of legs, painful red streaks on your breast.  Painful urination, constipation and increased pain or swelling or discharge with your incision. Pain Management:     Pain Management:   Take Acetaminophen (Tylenol) or Ibuprofen (Advil, Motrin), as directed for pain. Use a warm Sitz bath 3 times daily to relieve episiotomy or hemorrhoidal discomfort. For hemorrhoidal discomfort, use Tucks and Anusol cream as needed and directed.     Follow-Up Care:     Appointment with MD:   Follow-up Appointments   Procedures    FOLLOW UP VISIT Appointment in: 6 Weeks     Standing Status:   Standing     Number of Occurrences:   1     Order Specific Question:   Appointment in     Answer:   Doni Fontanez     Telephone number: [phone]      Signed By: Joss Driscoll MD Date: 8/25/2017 Time: 3:46 PM

## 2017-10-20 ENCOUNTER — OFFICE VISIT (OUTPATIENT)
Dept: OBGYN CLINIC | Age: 22
End: 2017-10-20

## 2017-10-20 VITALS
WEIGHT: 172 LBS | BODY MASS INDEX: 28.66 KG/M2 | SYSTOLIC BLOOD PRESSURE: 120 MMHG | HEIGHT: 65 IN | DIASTOLIC BLOOD PRESSURE: 70 MMHG

## 2017-10-20 DIAGNOSIS — Z30.430 VISIT FOR INSERTION OF INTRAUTERINE DEVICE: ICD-10-CM

## 2017-10-20 DIAGNOSIS — N94.89 SUPPRESSION OF MENSES: ICD-10-CM

## 2017-10-20 LAB
HCG URINE, QL. (POC): NEGATIVE
VALID INTERNAL CONTROL?: YES

## 2017-10-20 NOTE — ACP (ADVANCE CARE PLANNING)
[facility]  OFFICE PROCEDURE PROGRESS NOTE           Chart reviewed for the following:  Grady LUCIO, have reviewed the History, Physical and updated the Allergic reactions for Rocío De     TIME OUT performed immediately prior to start of procedure:  Grady LUCIO, have performed the following reviews on Cameron Justin prior to the start of the procedure:      * Patient was identified by name and date of birth   * Agreement on procedure being performed was verified  * Risks and Benefits explained to the patient  * Procedure site verified and marked as necessary  * Patient was positioned for comfort  * Consent was signed and verified      Time: 2:30pm        Date of procedure: 10/20/2017     Procedure performed by: Chloe Tadeo MD     Provider assisted by: Grady Rice MA     Patient assisted by: self     How tolerated by patient: tolerated the procedure well with no complications     Post Procedural Pain Scale: 0 - No Hurt     Comments: none      Mirena IUD INSERTION  Indications:  Cameron Justin is a ,  20 y.o. female [address] No LMP recorded. Her LMP was normal in duration and amount of flow. She  presents for insertion of an IUD. The risks, benefits and alternatives of IUD insertion were discussed in detail at last visit. She also has reviewed Mirena information. She has elected to proceed with the insertion today and she states she has no further questions. A urine pregnancy test was negative No components found for: SPEP, UPEP  Procedure: The pelvic exam revealed normal external genitalia. On bimanual exam the uterus was anteverted and normal in size with no tenderness present. A speculum was inserted into the vagina and the cervix was visualized. The cervix was prepped with zephiran solution. The anterior lip of the cervix was grasped with a single toothed tenaculum. The uterus was sounded with a [place] sound to 7 centimeters.  A Mirena was then inserted without difficulty. The string was cut to 3 centimeters. She experienced a mild  amount of cramping. Post Procedure Status:   She tolerated the procedure with mild discomfort. The patient was observed for 5 minutes after the insertion. There were no complications. Patient was discharged in stable condition. The patient received Mirena lot number [id number].

## 2017-10-20 NOTE — PROGRESS NOTES
Postpartum evaluation    Jocelyn Bryan is a 20 y.o. female who presents for a postpartum exam.     She is now eight weeks post normal spontaneous vaginal delivery. Her baby is doing well. She has had no menses since delivery. She has had the following significant problems since her delivery: none    The patient is breast feeding without difficulty. The patient would like to use the IUD for birth control. She is currently taking: no medications. She is due for her next AE in 12 months.      Visit Vitals    /70    Ht 5' 5\" (1.651 m)    Wt 172 lb (78 kg)    Breastfeeding Yes    BMI 28.62 kg/m2         PHYSICAL EXAMINATION    Constitutional  · Appearance: well-nourished, well developed, alert, in no acute distress    HENT  · Head and Face: appears normal    Neck  · Inspection/Palpation: normal appearance, no masses or tenderness  · Lymph Nodes: no lymphadenopathy present  · Thyroid: gland size normal, nontender, no nodules or masses present on palpation    Breasts  · Inspection of Breasts: breasts symmetrical, no skin changes, no discharge present, nipple appearance normal, no skin retraction present  · Palpation of Breasts and Axillae: no masses present on palpation, no breast tenderness  · Axillary Lymph Nodes: no lymphadenopathy present    Gastrointestinal  · Abdominal Examination: abdomen non-tender to palpation, normal bowel sounds, no masses present  · Liver and spleen: no hepatomegaly present, spleen not palpable  · Hernias: no hernias identified    Genitourinary  · External Genitalia: normal appearance for age, no discharge present, no tenderness present, no inflammatory lesions present, no masses present, no atrophy present  · Vagina: normal vaginal vault without central or paravaginal defects, no discharge present, no inflammatory lesions present, no masses present  · Bladder: non-tender to palpation  · Urethra: appears normal  · Cervix: normal   · Uterus: normal size, shape and consistency  · Adnexa: no adnexal tenderness present, no adnexal masses present  · Perineum: perineum within normal limits, no evidence of trauma, no rashes or skin lesions present  · Anus: anus within normal limits, no hemorrhoids present  · Inguinal Lymph Nodes: no lymphadenopathy present    Skin  · General Inspection: no rash, no lesions identified    Neurologic/Psychiatric  · Mental Status:  · Orientation: grossly oriented to person, place and time  · Mood and Affect: mood normal, affect appropriate    Assessment:  Normal postpartum check    Plan:  RTO for AE.   Rx for contraception: Mirena

## 2017-10-27 ENCOUNTER — TELEPHONE (OUTPATIENT)
Dept: OBGYN CLINIC | Age: 22
End: 2017-10-27

## 2017-10-27 NOTE — TELEPHONE ENCOUNTER
This nurse left a detailed message for the patient regarding MD recommendations and to call the office back if she has further questions.

## 2017-10-27 NOTE — TELEPHONE ENCOUNTER
Call received 10:46am      20year old patient last seen in the office on 10/20/17 for postpartum visit and IUD insertion. Patient reports vaginal bleeding starting the day she got the IUD. Patient reports some cramping that day and again today. Patient reports continued bleeding changing pad/tampon 2-3 time a day. Patient reports she is exclusively breast feeding and did not expect to have this bleeding. This nurse explained that she could have bleeding for up to 8 weeks. Patient advised to monitor for increased bleeding. Additional recommendations.       Please advise

## 2018-03-02 ENCOUNTER — OFFICE VISIT (OUTPATIENT)
Dept: OBGYN CLINIC | Age: 23
End: 2018-03-02

## 2018-03-02 VITALS
HEIGHT: 65 IN | WEIGHT: 172 LBS | BODY MASS INDEX: 28.66 KG/M2 | DIASTOLIC BLOOD PRESSURE: 70 MMHG | SYSTOLIC BLOOD PRESSURE: 120 MMHG

## 2018-03-02 DIAGNOSIS — N94.89 SUPPRESSION OF MENSES: ICD-10-CM

## 2018-03-02 DIAGNOSIS — Z30.432 ENCOUNTER FOR REMOVAL OF INTRAUTERINE CONTRACEPTIVE DEVICE: Primary | ICD-10-CM

## 2018-03-02 DIAGNOSIS — Z30.011 ENCOUNTER FOR INITIAL PRESCRIPTION OF CONTRACEPTIVE PILLS: ICD-10-CM

## 2018-03-02 RX ORDER — NORGESTIMATE AND ETHINYL ESTRADIOL 0.25-0.035
1 KIT ORAL DAILY
Qty: 3 DOSE PACK | Refills: 4 | Status: SHIPPED | OUTPATIENT
Start: 2018-03-02

## 2018-03-02 NOTE — PROGRESS NOTES
[facility]  OFFICE PROCEDURE PROGRESS NOTE           Chart reviewed for the following:  Kirk LUCIO, have reviewed the History, Physical and updated the Allergic reactions for Rocío De     TIME OUT performed immediately prior to start of procedure:  Kirk LUCIO, have performed the following reviews on Cameron Justin prior to the start of the procedure:      * Patient was identified by name and date of birth   * Agreement on procedure being performed was verified  * Risks and Benefits explained to the patient  * Procedure site verified and marked as necessary  * Patient was positioned for comfort  * Consent was signed and verified      Time: 2:00pm        Date of procedure: 3/2/2018     Procedure performed by: Radha Stein MD     Provider assisted by: Kirk Davis MA     Patient assisted by: self     How tolerated by patient: tolerated the procedure well with no complications     Post Procedural Pain Scale: 0 - No Hurt     Comments: none    -----------------------------------IUD REMOVAL---------------------  Indications for Removal:  Cameron Justin is a ,  20 y.o. female [address] whose No LMP recorded. Patient is not currently having periods (Reason: IUD). was on . who presents today for IUD removal. Her current IUD was placed six months. She has had some problems with the IUD. She requests removal of the IUD because of constant cramping. The IUD removal procedure was discussed with the patient and she had no further questions. Procedure: The patient was placed in a dorsal lithotomy position and appropriately draped. On bimanual exam the uterus was anterior and normal in size with no tenderness present. A speculum exam was performed and the cervix was visualized. The cervix was prepped with zephiran solution. The IUD string was visualized. Using ring forceps , the string was grasped and the IUD removed intact. The IUD was shown to the patient. Contraception evaluation/followup    The patient is a 20 y.o.,  No LMP recorded. Patient is not currently having periods (Reason: IUD). .     She is here for contraceptive counseling/folllow up. Her current method of family planning is IUD. The patient is sexually active. She states she is not satisfied with her current form of contraception because: cramping constantly   These concerns are moderate    Contraceptive History: has used other contraception in the past: POP. Side effects were: mild. Her current menstrual difficulty history: negative with no dysmenorrhea. Her previous menses she describes as light lasting up to 3 days. Pad or tampon count: changes every 8 hours. Preventive Medicine History: Last Pap smear:was normal.     Past Medical History:   Diagnosis Date    Asthma     Encounter for insertion of mirena IUD 10/20/2017    Encounter for IUD removal 2018        Past Surgical History:   Procedure Laterality Date    HX DILATION AND CURETTAGE  08/10/2016    Missed AB     Social History     Occupational History    Not on file. Social History Main Topics    Smoking status: Never Smoker    Smokeless tobacco: Never Used    Alcohol use No    Drug use: No    Sexual activity: Yes     Partners: Male     Birth control/ protection: None     Family History   Problem Relation Age of Onset    Hypertension Brother     Stroke Paternal Grandmother     Diabetes Maternal Grandmother     No Known Problems Mother     No Known Problems Father        No Known Allergies  Prior to Admission medications    Medication Sig Start Date End Date Taking? Authorizing Provider   norgestimate-ethinyl estradiol (Ryan Mijares) 0.25-35 mg-mcg tab Take 1 Tab by mouth daily. 3/2/18  Yes Mag Arroyo MD   HYDROcodone-acetaminophen [facility]) 7.5-325 mg per tablet Take 1 Tab by mouth every six (6) hours as needed for Pain. Max Daily Amount: 4 Tabs.  17   Mag Arroyo MD   AMBULATORY BREAST PUMP Use as directed 6/29/17   Sarah Leslie MD   PRENATAL VIT W-CA,FE,FA,<1 MG, (PRENATAL VITAMIN PO) Take  by mouth.     Historical Provider        Review of Systems - History obtained from the patient  Constitutional: negative for weight loss, fever, night sweats  HEENT: negative for hearing loss, earache, congestion, snoring, sorethroat  CV: negative for chest pain, palpitations, edema  Resp: negative for cough, shortness of breath, wheezing  Breast: negative for breast lumps, nipple discharge, galactorrhea  GI: negative for change in bowel habits, abdominal pain, black or bloody stools  : negative for frequency, dysuria, hematuria  MSK: negative for back pain, joint pain, muscle pain  Skin: negative for itching, rash, hives  Neuro: negative for dizziness, headache, confusion, weakness  Psych: negative for anxiety, depression, change in mood  Heme/lymph: negative for bleeding, bruising, pallor      Objective:    Visit Vitals    /70    Ht 5' 5\" (1.651 m)    Wt 172 lb (78 kg)    BMI 28.62 kg/m2          PHYSICAL EXAMINATION    Constitutional  · Appearance: well-nourished, well developed, alert, in no acute distress    HENT  · Head and Face: appears normal    Neck  · Inspection/Palpation: normal appearance, no masses or tenderness  · Lymph Nodes: no lymphadenopathy present  · Thyroid: gland size normal, nontender, no nodules or masses present on palpation  ·   Breasts  · Inspection of Breasts: breasts symmetrical, no skin changes, no discharge present, nipple appearance normal, no skin retraction present  · Palpation of Breasts and Axillae: no masses present on palpation, no breast tenderness  · Axillary Lymph Nodes: no lymphadenopathy present    Gastrointestinal  · Abdominal Examination: abdomen non-tender to palpation, normal bowel sounds, no masses present  · Liver and spleen: no hepatomegaly present, spleen not palpable  · Hernias: no hernias identified    Genitourinary  · External Genitalia: normal appearance for age, no discharge present, no tenderness present, no inflammatory lesions present, no masses present, no atrophy present  · Vagina: normal vaginal vault without central or paravaginal defects, no discharge present, no inflammatory lesions present, no masses present  · Bladder: non-tender to palpation  · Urethra: appears normal  · Cervix: normal   · Uterus: normal size, shape and consistency  · Adnexa: no adnexal tenderness present, no adnexal masses present  · Perineum: perineum within normal limits, no evidence of trauma, no rashes or skin lesions present  · Anus: anus within normal limits, no hemorrhoids present  · Inguinal Lymph Nodes: no lymphadenopathy present    Skin  · General Inspection: no rash, no lesions identified    Neurologic/Psychiatric  · Mental Status:  · Orientation: grossly oriented to person, place and time  · Mood and Affect: mood normal, affect appropriate    Assessment:   Contraception management:   Advised OCP    Plan:   Rx Sprintec        Instructions given to pt. Handouts given to pt.
